# Patient Record
Sex: FEMALE | Race: OTHER | HISPANIC OR LATINO
[De-identification: names, ages, dates, MRNs, and addresses within clinical notes are randomized per-mention and may not be internally consistent; named-entity substitution may affect disease eponyms.]

---

## 2017-02-03 ENCOUNTER — APPOINTMENT (OUTPATIENT)
Dept: FAMILY MEDICINE | Facility: CLINIC | Age: 47
End: 2017-02-03

## 2017-02-03 ENCOUNTER — OTHER (OUTPATIENT)
Age: 47
End: 2017-02-03

## 2017-02-03 VITALS — HEART RATE: 72 BPM | DIASTOLIC BLOOD PRESSURE: 60 MMHG | SYSTOLIC BLOOD PRESSURE: 92 MMHG

## 2017-02-03 DIAGNOSIS — J30.9 ALLERGIC RHINITIS, UNSPECIFIED: ICD-10-CM

## 2017-02-03 DIAGNOSIS — J32.9 CHRONIC SINUSITIS, UNSPECIFIED: ICD-10-CM

## 2017-04-18 ENCOUNTER — MEDICATION RENEWAL (OUTPATIENT)
Age: 47
End: 2017-04-18

## 2017-06-26 ENCOUNTER — NON-APPOINTMENT (OUTPATIENT)
Age: 47
End: 2017-06-26

## 2017-06-26 ENCOUNTER — APPOINTMENT (OUTPATIENT)
Dept: FAMILY MEDICINE | Facility: CLINIC | Age: 47
End: 2017-06-26

## 2017-06-26 VITALS
BODY MASS INDEX: 23.56 KG/M2 | TEMPERATURE: 99.2 F | SYSTOLIC BLOOD PRESSURE: 98 MMHG | HEART RATE: 92 BPM | DIASTOLIC BLOOD PRESSURE: 62 MMHG | WEIGHT: 138 LBS | OXYGEN SATURATION: 98 % | HEIGHT: 64 IN

## 2017-06-26 DIAGNOSIS — R42 DIZZINESS AND GIDDINESS: ICD-10-CM

## 2017-06-26 RX ORDER — AMOXICILLIN AND CLAVULANATE POTASSIUM 875; 125 MG/1; MG/1
875-125 TABLET, COATED ORAL
Qty: 20 | Refills: 0 | Status: DISCONTINUED | COMMUNITY
Start: 2017-02-03 | End: 2017-06-26

## 2017-06-26 RX ORDER — LEVOCETIRIZINE DIHYDROCHLORIDE 5 MG/1
5 TABLET ORAL DAILY
Qty: 30 | Refills: 5 | Status: DISCONTINUED | COMMUNITY
Start: 2017-02-03 | End: 2017-06-26

## 2017-06-27 ENCOUNTER — INPATIENT (INPATIENT)
Facility: HOSPITAL | Age: 47
LOS: 1 days | Discharge: HOME | DRG: 282 | End: 2017-06-29
Attending: HOSPITALIST | Admitting: THORACIC SURGERY (CARDIOTHORACIC VASCULAR SURGERY)
Payer: COMMERCIAL

## 2017-06-27 VITALS
DIASTOLIC BLOOD PRESSURE: 61 MMHG | RESPIRATION RATE: 18 BRPM | OXYGEN SATURATION: 100 % | HEART RATE: 77 BPM | TEMPERATURE: 98 F | SYSTOLIC BLOOD PRESSURE: 128 MMHG

## 2017-06-27 DIAGNOSIS — I51.81 TAKOTSUBO SYNDROME: ICD-10-CM

## 2017-06-27 DIAGNOSIS — F41.9 ANXIETY DISORDER, UNSPECIFIED: ICD-10-CM

## 2017-06-27 DIAGNOSIS — I25.10 ATHEROSCLEROTIC HEART DISEASE OF NATIVE CORONARY ARTERY WITHOUT ANGINA PECTORIS: ICD-10-CM

## 2017-06-27 DIAGNOSIS — E78.5 HYPERLIPIDEMIA, UNSPECIFIED: ICD-10-CM

## 2017-06-27 DIAGNOSIS — Z98.890 OTHER SPECIFIED POSTPROCEDURAL STATES: Chronic | ICD-10-CM

## 2017-06-27 DIAGNOSIS — N94.6 DYSMENORRHEA, UNSPECIFIED: ICD-10-CM

## 2017-06-27 DIAGNOSIS — M54.42 LUMBAGO WITH SCIATICA, LEFT SIDE: ICD-10-CM

## 2017-06-27 DIAGNOSIS — I21.4 NON-ST ELEVATION (NSTEMI) MYOCARDIAL INFARCTION: ICD-10-CM

## 2017-06-27 PROBLEM — R42 LIGHTHEADEDNESS: Status: ACTIVE | Noted: 2017-06-27

## 2017-06-27 LAB
ANION GAP SERPL CALC-SCNC: 15 MMOL/L — SIGNIFICANT CHANGE UP (ref 5–17)
APTT BLD: 28.7 SEC — SIGNIFICANT CHANGE UP (ref 27.5–37.4)
BUN SERPL-MCNC: 6 MG/DL — LOW (ref 8–20)
CALCIUM SERPL-MCNC: 8.9 MG/DL — SIGNIFICANT CHANGE UP (ref 8.6–10.2)
CHLORIDE SERPL-SCNC: 101 MMOL/L — SIGNIFICANT CHANGE UP (ref 98–107)
CHOLEST SERPL-MCNC: 210 MG/DL — HIGH (ref 110–199)
CO2 SERPL-SCNC: 24 MMOL/L — SIGNIFICANT CHANGE UP (ref 22–29)
CREAT SERPL-MCNC: 0.47 MG/DL — LOW (ref 0.5–1.3)
GLUCOSE SERPL-MCNC: 131 MG/DL — HIGH (ref 70–115)
HDLC SERPL-MCNC: 44 MG/DL — LOW
INR BLD: 1.02 RATIO — SIGNIFICANT CHANGE UP (ref 0.88–1.16)
LIPID PNL WITH DIRECT LDL SERPL: 104 MG/DL — SIGNIFICANT CHANGE UP
POTASSIUM SERPL-MCNC: 3.8 MMOL/L — SIGNIFICANT CHANGE UP (ref 3.5–5.3)
POTASSIUM SERPL-SCNC: 3.8 MMOL/L — SIGNIFICANT CHANGE UP (ref 3.5–5.3)
PROTHROM AB SERPL-ACNC: 11.2 SEC — SIGNIFICANT CHANGE UP (ref 9.8–12.7)
SODIUM SERPL-SCNC: 140 MMOL/L — SIGNIFICANT CHANGE UP (ref 135–145)
TOTAL CHOLESTEROL/HDL RATIO MEASUREMENT: 5 RATIO — SIGNIFICANT CHANGE UP (ref 3.3–7.1)
TRIGL SERPL-MCNC: 311 MG/DL — HIGH (ref 10–200)
TROPONIN T SERPL-MCNC: 0.04 NG/ML — SIGNIFICANT CHANGE UP (ref 0–0.06)

## 2017-06-27 PROCEDURE — 99255 IP/OBS CONSLTJ NEW/EST HI 80: CPT

## 2017-06-27 PROCEDURE — 93458 L HRT ARTERY/VENTRICLE ANGIO: CPT | Mod: 26

## 2017-06-27 PROCEDURE — 93306 TTE W/DOPPLER COMPLETE: CPT | Mod: 26

## 2017-06-27 PROCEDURE — 93010 ELECTROCARDIOGRAM REPORT: CPT

## 2017-06-27 PROCEDURE — 99232 SBSQ HOSP IP/OBS MODERATE 35: CPT

## 2017-06-27 RX ORDER — CARVEDILOL PHOSPHATE 80 MG/1
3.12 CAPSULE, EXTENDED RELEASE ORAL EVERY 12 HOURS
Qty: 0 | Refills: 0 | Status: DISCONTINUED | OUTPATIENT
Start: 2017-06-27 | End: 2017-06-29

## 2017-06-27 RX ORDER — PANTOPRAZOLE SODIUM 20 MG/1
40 TABLET, DELAYED RELEASE ORAL
Qty: 0 | Refills: 0 | Status: DISCONTINUED | OUTPATIENT
Start: 2017-06-27 | End: 2017-06-29

## 2017-06-27 RX ORDER — ACETAMINOPHEN 500 MG
650 TABLET ORAL EVERY 6 HOURS
Qty: 0 | Refills: 0 | Status: DISCONTINUED | OUTPATIENT
Start: 2017-06-27 | End: 2017-06-29

## 2017-06-27 RX ORDER — ASPIRIN/CALCIUM CARB/MAGNESIUM 324 MG
81 TABLET ORAL DAILY
Qty: 0 | Refills: 0 | Status: DISCONTINUED | OUTPATIENT
Start: 2017-06-27 | End: 2017-06-29

## 2017-06-27 RX ORDER — ENOXAPARIN SODIUM 100 MG/ML
40 INJECTION SUBCUTANEOUS DAILY
Qty: 0 | Refills: 0 | Status: DISCONTINUED | OUTPATIENT
Start: 2017-06-28 | End: 2017-06-29

## 2017-06-27 RX ORDER — POTASSIUM CHLORIDE 20 MEQ
20 PACKET (EA) ORAL ONCE
Qty: 0 | Refills: 0 | Status: COMPLETED | OUTPATIENT
Start: 2017-06-27 | End: 2017-06-27

## 2017-06-27 RX ADMIN — Medication 20 MILLIEQUIVALENT(S): at 20:42

## 2017-06-27 RX ADMIN — Medication 650 MILLIGRAM(S): at 22:24

## 2017-06-27 RX ADMIN — CARVEDILOL PHOSPHATE 3.12 MILLIGRAM(S): 80 CAPSULE, EXTENDED RELEASE ORAL at 17:26

## 2017-06-27 RX ADMIN — Medication 650 MILLIGRAM(S): at 20:42

## 2017-06-27 NOTE — DISCHARGE NOTE ADULT - PLAN OF CARE
Remain free of worsening cardiomyopathy Restricted use with no heavy lifting of affected arm for 48 hours.  No submerging the arm in water for 48 hours.  You may start showering today.  Call your doctor for any bleeding, swelling, loss of sensation in the hand or fingers, or fingers turning blue.  If heavy bleeding or large lumps form, hold pressure at the spot and come to the Emergency Room.  Follow up with Dr. Montanez in one to two weeks.

## 2017-06-27 NOTE — DISCHARGE NOTE ADULT - CARE PLAN
Principal Discharge DX:	Stress-induced cardiomyopathy  Goal:	Remain free of worsening cardiomyopathy  Instructions for follow-up, activity and diet:	Restricted use with no heavy lifting of affected arm for 48 hours.  No submerging the arm in water for 48 hours.  You may start showering today.  Call your doctor for any bleeding, swelling, loss of sensation in the hand or fingers, or fingers turning blue.  If heavy bleeding or large lumps form, hold pressure at the spot and come to the Emergency Room.  Follow up with Dr. Montanez in one to two weeks.

## 2017-06-27 NOTE — DISCHARGE NOTE ADULT - HOSPITAL COURSE
46 y/o female with no significant past medical history metromenorrhagia ( takes birth control pills for this reason) who went to [place] ER 6/26/17 from PMD office with c/o CP and SOB x 2 days intermittently.  Symptoms associated with dizziness, lightheadedness, and diaphoresis.  In the ER, the patient was noted to have elevated troponin 2.59 and hypotension BP 80/60.  CTA was negative for PE at [facility].  Urine pregnancy negative.  Patient transferred to [facility] for [facility] to r/o CAD on 6/27 under CTICU service.   Patient underwent  cardiac catheterization on 6/27. Revealed EF 40%, normal coronaries and morphology consistent with takotsubo cardiomyopathy.  transferred out of CTICU 6/28. She had ventricular ectopics. Started on toprol -XL per cardio recommendations prior tp dc. Will need f/u with cardio and PMD for hyperlipidemia.Discussed with pt in details      Time spent in discharge planning and co-ordination : 40min

## 2017-06-27 NOTE — DISCHARGE NOTE ADULT - PATIENT PORTAL LINK FT
“You can access the [facility] Patient Portal, offered by [facility], by registering with the following website: [email or web address]”

## 2017-06-27 NOTE — H&P PST ADULT - NSANTHOSAYNRD_GEN_A_CORE
No. HOMER screening performed.  STOP BANG Legend: 0-2 = LOW Risk; 3-4 = INTERMEDIATE Risk; 5-8 = HIGH Risk

## 2017-06-27 NOTE — PROGRESS NOTE ADULT - PROBLEM SELECTOR PLAN 1
S/P Left Heart Cath showing no coronary disease  Currently NSR with stable blood pressure  Continuous cardiac monitoring   Per Dr. Giraldo, plan to monitor patient tonight and downgrade to telemetry in AM  F/U cardiac enzymes with morning labs- currently downtrending

## 2017-06-27 NOTE — PATIENT PROFILE ADULT. - FAMILY HISTORY
Grandparent  Still living? Unknown  CAD (coronary artery disease), Age at diagnosis: 61-70     Uncle  Still living? Yes, Estimated age: Age Unknown  CAD (coronary artery disease), Age at diagnosis: 51-60

## 2017-06-27 NOTE — H&P PST ADULT - HISTORY OF PRESENT ILLNESS
This is a 46 y/o female with h/o chronic back pain and dysmenorrhea ( takes birth control pills for this reason) who went to [place] ER 6/26/17 from PMD office with c/o CP and SOB x 2 days intermittently.  Symptoms associated with dizziness, lightheadedness, and diaphoresis.  In the ER, the patient was noted to have elevated troponin 2.59 and hypotension BP 80/60.  CTA was negative for PE at [facility].  Urine pregnancy negative.  Patient transferred to [facility] for LHC to r/o CAD.

## 2017-06-27 NOTE — DISCHARGE NOTE ADULT - MEDICATION SUMMARY - MEDICATIONS TO TAKE
I will START or STAY ON the medications listed below when I get home from the hospital:    aspirin 81 mg oral tablet, chewable  -- 1 tab(s) by mouth once a day  -- Indication: For Prophylactic measure    atorvastatin 20 mg oral tablet  -- 1 tab(s) by mouth once a day  -- Avoid grapefruit and grapefruit juice while taking this medication.  Do not take this drug if you are pregnant.  It is very important that you take or use this exactly as directed.  Do not skip doses or discontinue unless directed by your doctor.  Obtain medical advice before taking any non-prescription drugs as some may affect the action of this medication.  Take with food or milk.    -- Indication: For Mixed hyperlipidemia    metoprolol 25 mg oral tablet, extended release  -- 1 tab(s) by mouth once a day  -- It is very important that you take or use this exactly as directed.  Do not skip doses or discontinue unless directed by your doctor.  May cause drowsiness.  Alcohol may intensify this effect.  Use care when operating dangerous machinery.  Some non-prescription drugs may aggravate your condition.  Read all labels carefully.  If a warning appears, check with your doctor before taking.  Swallow whole.  Do not crush.  Take with food or milk.  This drug may impair the ability to drive or operate machinery.  Use care until you become familiar with its effects.    -- Indication: For Stress-induced cardiomyopathy    Flexeril  -- 5 milligram(s) by mouth once a day (at bedtime)  -- Indication: For Chronic back pain

## 2017-06-27 NOTE — PROGRESS NOTE ADULT - ASSESSMENT
47 year old female admitted to Cardiothoracic ICU. Patient started having symptoms of nausea, lightheadedness, diaphoresis, and dyspnea on exertion since Saturday 6/24, symptoms worsened on Sunday when she felt lightheaded and near-faint when climbing stairs. Patient states her symptoms worsened with minimal exertion, such as walking from her home to her car. She then presented to her PCP Dr. Vasques Monday morning at which point she was sent to [facility]'s ER. On admission, patient had elevated troponins (2.09 - per chart); hypotension 80/60; CTA that was negative for PE, Urine HcG negative; and was then transferred to [facility] for LHC.  6/27: LHC: normal coronary arteries; EF 40%; apical hypokinesis, TTE done, results pending.

## 2017-06-27 NOTE — DISCHARGE NOTE ADULT - MEDICATION SUMMARY - MEDICATIONS TO STOP TAKING
I will STOP taking the medications listed below when I get home from the hospital:    Balziva 0.4 mg-35 mcg oral tablet  -- 1 tab(s) by mouth once a day

## 2017-06-27 NOTE — PROGRESS NOTE ADULT - SUBJECTIVE AND OBJECTIVE BOX
Cardiology NP note:     -see H & P    -s/p LHC: normal coronary arteries; EF 40%; apical hypokinesis consistent with stress induced cardiomyopathy or Takotsubo.  -Right radial band in place--Site benign without hematoma/bleeding; RUE warm/mobile/acyanotic; +right ulnar pulse  -VS: /60 Tele NSR 69 RR 12 Pox 100    -48 y/o female admitted to OSH with +NSTEMI now s/p LHC revealing normal coronary arteries and apical hypokinesis consistent with stress induced cardiomyopathy EF 40%.  -Admit to Tele  -Add low dose beta blocker and baby aspirin (BP low and does not allow for addition of ACEI at this time); discontinue plavix  -Discontinue radial band at 1530  -Bedrest until 1530 then OOB as tolerated  -Home meds: Birth control pills not recommended at this time as per Dr. Montanez  -D/W Dr. Montanez

## 2017-06-27 NOTE — PROGRESS NOTE ADULT - SUBJECTIVE AND OBJECTIVE BOX
Subjective:  47 year old female now admitted to CTICU, no acute complaints.     Past Medical History:  Atherosclerosis of native coronary artery without angina pectoris  CAD (coronary artery disease) (Grandparent, Uncle)  Diverticulitis  Arthritis  Anxiety  Chronic back pain  Dysmenorrhea  Stress-induced cardiomyopathy  History of back surgery  NSTEMI      aspirin  chewable 81 milliGRAM(s) Chew daily  carvedilol 3.125 milliGRAM(s) Oral every 12 hours  potassium chloride    Tablet ER 20 milliEquivalent(s) Oral once  MEDICATIONS  (PRN):    Height (cm): 162.6 (06-27 @ 14:22)  Weight (kg): 63.503 (06-27 @ 14:22)  BMI (kg/m2): 24 (06-27 @ 14:22)  BSA (m2): 1.68 (06-27 @ 14:22)  Daily     Daily         06-27    140  |  101  |  6.0<L>  ----------------------------<  131<H>  3.8   |  24.0  |  0.47<L>    Ca    8.9      27 Jun 2017 17:35      CARDIAC MARKERS ( 27 Jun 2017 17:35 )  x     / 0.04 ng/mL / x     / x     / x        PT/INR - ( 27 Jun 2017 17:35 )   PT: 11.2 sec;   INR: 1.02 ratio         PTT - ( 27 Jun 2017 17:35 )  PTT:28.7 sec      Objective:  T(C): 36.9 (06-27-17 @ 18:00), Max: 36.9 (06-27-17 @ 18:00)  HR: 79 (06-27-17 @ 19:05) (62 - 84)  BP: 115/54 (06-27-17 @ 19:05) (97/58 - 128/61)  RR: 23 (06-27-17 @ 19:05) (16 - 23)  SpO2: 99% (06-27-17 @ 19:05) (97% - 100%)  Wt(kg): --CAPILLARY BLOOD GLUCOSE      I&O's Summary      Physical Exam:  Neuro: awake, alert, oriented x 3  Pulm: CTA b/l, no wheezing or rales  CV: S1S2, no murmurs, NSR, RR  Abd: Soft, NT, ND, normoactive bowel sounds  Ext: +DP pulses b/l, no pedal edema, no asymmetry of b/l lower extremities  Inc: Right radial cath site without hematoma

## 2017-06-27 NOTE — DISCHARGE NOTE ADULT - CARE PROVIDER_API CALL
Nabil Montanez), Cardiology; Internal Medicine; Interventional Cardiology  [address] [place] [address]  Phone: [phone]  Fax: [phone]

## 2017-06-27 NOTE — DISCHARGE NOTE ADULT - NS AS ACTIVITY OBS
Showering allowed/Stairs allowed/No Heavy lifting/straining/Walking-Outdoors allowed/Walking-Indoors allowed

## 2017-06-28 DIAGNOSIS — Z29.9 ENCOUNTER FOR PROPHYLACTIC MEASURES, UNSPECIFIED: ICD-10-CM

## 2017-06-28 DIAGNOSIS — R52 PAIN, UNSPECIFIED: ICD-10-CM

## 2017-06-28 DIAGNOSIS — I51.81 TAKOTSUBO SYNDROME: ICD-10-CM

## 2017-06-28 DIAGNOSIS — I49.3 VENTRICULAR PREMATURE DEPOLARIZATION: ICD-10-CM

## 2017-06-28 DIAGNOSIS — N92.1 EXCESSIVE AND FREQUENT MENSTRUATION WITH IRREGULAR CYCLE: ICD-10-CM

## 2017-06-28 LAB
ANION GAP SERPL CALC-SCNC: 14 MMOL/L — SIGNIFICANT CHANGE UP (ref 5–17)
BUN SERPL-MCNC: 7 MG/DL — LOW (ref 8–20)
CALCIUM SERPL-MCNC: 8.9 MG/DL — SIGNIFICANT CHANGE UP (ref 8.6–10.2)
CHLORIDE SERPL-SCNC: 102 MMOL/L — SIGNIFICANT CHANGE UP (ref 98–107)
CK SERPL-CCNC: 54 U/L — SIGNIFICANT CHANGE UP (ref 25–170)
CO2 SERPL-SCNC: 23 MMOL/L — SIGNIFICANT CHANGE UP (ref 22–29)
CREAT SERPL-MCNC: 0.51 MG/DL — SIGNIFICANT CHANGE UP (ref 0.5–1.3)
GLUCOSE SERPL-MCNC: 91 MG/DL — SIGNIFICANT CHANGE UP (ref 70–115)
HCT VFR BLD CALC: 37.8 % — SIGNIFICANT CHANGE UP (ref 37–47)
HGB BLD-MCNC: 13.1 G/DL — SIGNIFICANT CHANGE UP (ref 12–16)
MAGNESIUM SERPL-MCNC: 1.9 MG/DL — SIGNIFICANT CHANGE UP (ref 1.6–2.6)
MCHC RBC-ENTMCNC: 29.3 PG — SIGNIFICANT CHANGE UP (ref 27–31)
MCHC RBC-ENTMCNC: 34.7 G/DL — SIGNIFICANT CHANGE UP (ref 32–36)
MCV RBC AUTO: 84.6 FL — SIGNIFICANT CHANGE UP (ref 81–99)
PLATELET # BLD AUTO: 311 K/UL — SIGNIFICANT CHANGE UP (ref 150–400)
POTASSIUM SERPL-MCNC: 4.2 MMOL/L — SIGNIFICANT CHANGE UP (ref 3.5–5.3)
POTASSIUM SERPL-SCNC: 4.2 MMOL/L — SIGNIFICANT CHANGE UP (ref 3.5–5.3)
RBC # BLD: 4.47 M/UL — SIGNIFICANT CHANGE UP (ref 4.4–5.2)
RBC # FLD: 12.3 % — SIGNIFICANT CHANGE UP (ref 11–15.6)
SODIUM SERPL-SCNC: 139 MMOL/L — SIGNIFICANT CHANGE UP (ref 135–145)
TROPONIN T SERPL-MCNC: 0.02 NG/ML — SIGNIFICANT CHANGE UP (ref 0–0.06)
WBC # BLD: 7.5 K/UL — SIGNIFICANT CHANGE UP (ref 4.8–10.8)
WBC # FLD AUTO: 7.5 K/UL — SIGNIFICANT CHANGE UP (ref 4.8–10.8)

## 2017-06-28 PROCEDURE — 99233 SBSQ HOSP IP/OBS HIGH 50: CPT

## 2017-06-28 PROCEDURE — 99254 IP/OBS CNSLTJ NEW/EST MOD 60: CPT

## 2017-06-28 PROCEDURE — 93010 ELECTROCARDIOGRAM REPORT: CPT

## 2017-06-28 PROCEDURE — 71010: CPT | Mod: 26

## 2017-06-28 RX ORDER — LOPERAMIDE HCL 2 MG
2 TABLET ORAL EVERY 8 HOURS
Qty: 0 | Refills: 0 | Status: DISCONTINUED | OUTPATIENT
Start: 2017-06-28 | End: 2017-06-29

## 2017-06-28 RX ORDER — CYCLOBENZAPRINE HYDROCHLORIDE 10 MG/1
5 TABLET, FILM COATED ORAL AT BEDTIME
Qty: 0 | Refills: 0 | Status: DISCONTINUED | OUTPATIENT
Start: 2017-06-28 | End: 2017-06-29

## 2017-06-28 RX ADMIN — CARVEDILOL PHOSPHATE 3.12 MILLIGRAM(S): 80 CAPSULE, EXTENDED RELEASE ORAL at 18:20

## 2017-06-28 RX ADMIN — PANTOPRAZOLE SODIUM 40 MILLIGRAM(S): 20 TABLET, DELAYED RELEASE ORAL at 06:39

## 2017-06-28 RX ADMIN — Medication 81 MILLIGRAM(S): at 12:39

## 2017-06-28 RX ADMIN — CYCLOBENZAPRINE HYDROCHLORIDE 5 MILLIGRAM(S): 10 TABLET, FILM COATED ORAL at 21:01

## 2017-06-28 RX ADMIN — CARVEDILOL PHOSPHATE 3.12 MILLIGRAM(S): 80 CAPSULE, EXTENDED RELEASE ORAL at 06:39

## 2017-06-28 RX ADMIN — Medication 650 MILLIGRAM(S): at 04:38

## 2017-06-28 RX ADMIN — Medication 650 MILLIGRAM(S): at 12:54

## 2017-06-28 RX ADMIN — Medication 2 MILLIGRAM(S): at 12:40

## 2017-06-28 RX ADMIN — Medication 650 MILLIGRAM(S): at 12:39

## 2017-06-28 RX ADMIN — Medication 650 MILLIGRAM(S): at 03:33

## 2017-06-28 NOTE — PROGRESS NOTE ADULT - PROBLEM SELECTOR PLAN 1
continue coreg  ACEI when BP will tolerate  TTE done (awaiting official read)  social work consult for support/resources upon discharge  will confirm with Dr Giraldo on am rounds re: transfer patient to medicine service for further care as pt does not require CT surgical intervention at this time continue coreg  ACEI when BP will tolerate  TTE done (awaiting official read)  social work consult for support/resources upon discharge  stable for transfer to floor  will confirm with Dr Giraldo on am rounds re: transfer patient to medicine service for further care as pt does not require CT surgical intervention at this time

## 2017-06-28 NOTE — PROGRESS NOTE ADULT - PROBLEM SELECTOR PLAN 1
continue coreg  ACEI when BP will tolerate  TTE done  social work consult for support/resources upon discharge  stable for transfer to medicine / telemetry floor  Discussed with,  Dr Giraldo on am rounds patient is not surgical candidate, is stable for floor and transfer to medicine service.

## 2017-06-28 NOTE — PROGRESS NOTE ADULT - SUBJECTIVE AND OBJECTIVE BOX
MEDICINE  TRANSFER NOTE       LYNN HORTA    [id number]    47y      Female    HPI in brief:  46 y/o female with no significant past medical history metromenorrhagia ( takes birth control pills for this reason) who went to [place] ER 6/26/17 from PMD office with c/o CP and SOB x 2 days intermittently.  Symptoms associated with dizziness, lightheadedness, and diaphoresis.  In the ER, the patient was noted to have elevated troponin 2.59 and hypotension BP 80/60.  CTA was negative for PE at [facility].  Urine pregnancy negative.  Patient transferred to [facility] for C to r/o CAD on 6/27 under CTICU service.   Patient underwent  cardiac catheterization on 6/27. Revealed EF 40%, normal coronaries and morphology consistent with takotsubo cardiomyopathy.  Patient is hemodynamically stable, stable for floor.    She denies chest pain, shortness of breath but states that she feels a little anxious which she states is her baseline.  patient states that she has been stressful since last 1mth.     CC:chest pain     INTERVAL HPI/OVERNIGHT EVENTS:  as in HPI    REVIEW OF SYSTEMS:    CONSTITUTIONAL: No fever, weight loss, or fatigue  RESPIRATORY: No cough, wheezing, hemoptysis; No shortness of breath  GASTROINTESTINAL: No abdominal or epigastric pain. No nausea, vomiting        Vital Signs Last 24 Hrs  T(C): 36.9 (28 Jun 2017 12:00), Max: 36.9 (27 Jun 2017 18:00)  T(F): 98.4 (28 Jun 2017 12:00), Max: 98.4 (27 Jun 2017 18:00)  HR: 97 (28 Jun 2017 08:00) (62 - 97)  BP: 105/58 (28 Jun 2017 08:00) (82/48 - 118/58)  BP(mean): 77 (28 Jun 2017 08:00) (60 - 88)  RR: 20 (28 Jun 2017 08:00) (16 - 23)  SpO2: 99% (28 Jun 2017 08:00) (97% - 100%)    PHYSICAL EXAM:    GENERAL: NAD, well-groomed  HEENT: PERRL, +EOMI  NECK: soft, Supple, No JVD,   CHEST/LUNG: Clear to percussion bilaterally; No wheezing  HEART: S1S2+, Regular rate and rhythm; No murmurs, rubs, or gallops  ABDOMEN: Soft, Nontender, Nondistended; Bowel sounds present  EXTREMITIES:  No clubbing, cyanosis, or edema  NEURO: AAOX3, no focal deficits  PSYCH: normal mood      06-27 @ 07:01  -  06-28 @ 07:00  --------------------------------------------------------  IN: 460 mL / OUT: 0 mL / NET: 460 mL    06-28 @ 07:01 - 06-28 @ 15:06  --------------------------------------------------------  IN: 740 mL / OUT: 400 mL / NET: 340 mL        LABS:                        13.1   7.5   )-----------( 311      ( 28 Jun 2017 04:09 )             37.8     06-28    139  |  102  |  7.0<L>  ----------------------------<  91  4.2   |  23.0  |  0.51    Ca    8.9      28 Jun 2017 04:09  Mg     1.9     06-28      PT/INR - ( 27 Jun 2017 17:35 )   PT: 11.2 sec;   INR: 1.02 ratio         PTT - ( 27 Jun 2017 17:35 )  PTT:28.7 sec        MEDICATIONS  (STANDING):  aspirin  chewable 81 milliGRAM(s) Chew daily  carvedilol 3.125 milliGRAM(s) Oral every 12 hours  enoxaparin Injectable 40 milliGRAM(s) SubCutaneous daily  pantoprazole    Tablet 40 milliGRAM(s) Oral before breakfast    MEDICATIONS  (PRN):  acetaminophen   Tablet. 650 milliGRAM(s) Oral every 6 hours PRN pain  loperamide 2 milliGRAM(s) Oral every 8 hours PRN Diarrhea      RADIOLOGY & ADDITIONAL TESTS:    CXR - NAD    ECHO - Summary:   1. Endocardial visualization was enhanced with intravenous echo contrast.   2. Technically limited study.   3. Normal global left ventricular systolic function.   4. Left ventricular ejection fraction, by visual estimation, is 60 to   65%.   5. There is mild septal left ventricular hypertrophy.   6. False tendon is seen in LV apex.   7. Trace mitral valve regurgitation.   8. There is no evidence of pericardial effusion.    EKG - tracing reviewed - NSR

## 2017-06-28 NOTE — PROGRESS NOTE ADULT - SUBJECTIVE AND OBJECTIVE BOX
Subjective: right wrist pain at cath site but otherwise denies c/o at this time.  Denies CP, SOB, palpitations, dizziness, N/V, other c/o.    T(C): 36.9 (06-27-17 @ 18:00), Max: 36.9 (06-27-17 @ 18:00)  HR: 68 (06-28-17 @ 04:00) (62 - 87), NSR  BP: 101/59 (06-28-17 @ 04:00) (82/48 - 128/61)  RR: 19 (06-28-17 @ 04:00) (16 - 23)  SpO2: 98% (06-28-17 @ 04:00) (97% - 100%) on room air      I&O's Detail    27 Jun 2017 07:01  -  28 Jun 2017 04:48  --------------------------------------------------------  IN:    Oral Fluid: 460 mL  Total IN: 460 mL    OUT:  Total OUT: 0 mL    Total NET: 460 mL      06-27    140  |  101  |  6.0<L>  ----------------------------<  131<H>  3.8   |  24.0  |  0.47<L>    Ca    8.9      27 Jun 2017 17:35                        13.1   7.5   )-----------( 311      ( 28 Jun 2017 04:09 )             37.8     PT/INR - ( 27 Jun 2017 17:35 )   PT: 11.2 sec;   INR: 1.02 ratio    PTT - ( 27 Jun 2017 17:35 )  PTT:28.7 sec    CARDIAC MARKERS ( 27 Jun 2017 17:35 )  CKx     / CKMBx     / Troponin T0.04 ng/mL /    < from: Cardiac Cath Lab - Adult (06.27.17 @ 13:36) >  VENTRICLES: Analysis of regional contractile function demonstrated moderate  anterolateral hypokinesis, severe apical hypokinesis, and mild  diaphragmatic hypokinesis. EF estimated was 40 %.  CORONARY VESSELS: The coronary circulation is right dominant.  LM:   --  LM: Normal.  LAD:   --  LAD: Normal.  CX:   --  Circumflex: Normal.  RCA:   --  RCA: Normal.  COMPLICATIONS: No complications occurred during the cath lab visit.  DIAGNOSTIC IMPRESSIONS: Normal coronary arteries, apical hypokinesis.   Typical presentation for Takotsubo or stress induced cardiomyopathy.  DIAGNOSTIC RECOMMENDATIONS: ASA and beta blocker for ACS. BP does not allow for ACEI at this time.  < end of copied text >    6/28/17 CXR: clear/NAD      MEDICATIONS  (STANDING):  aspirin  chewable 81 milliGRAM(s) Chew daily  carvedilol 3.125 milliGRAM(s) Oral every 12 hours  enoxaparin Injectable 40 milliGRAM(s) SubCutaneous daily  pantoprazole    Tablet 40 milliGRAM(s) Oral before breakfast    MEDICATIONS  (PRN):  acetaminophen   Tablet. 650 milliGRAM(s) Oral every 6 hours PRN pain      Physical Exam  Neuro: A+O x 3, non-focal, speech clear and intact  Pulm: CTA, equal bilaterally  CV: RRR, +S1S2, no murmur  Abd: soft, NT, ND, +BS  Ext: VAZQUEZ x 4, no edema  right wrist tender at cath site but C/D/I with dsg

## 2017-06-28 NOTE — PROGRESS NOTE ADULT - PROBLEM SELECTOR PLAN 5
recommended by Dr Montanez that patient avoids further BCP/oral contraceptive use  consider Gyn consult (will defer need for to medicine/cardiology)
States anxiety is a chronic issue not treated with any medication    Plan discussed with Dr. Giraldo. Will further discuss plan at AM rounds.
recommended by Dr Montanez that patient avoids further BCP/oral contraceptive use  consider Gyn consult (will defer need for to medicine/cardiology)

## 2017-06-28 NOTE — PROGRESS NOTE ADULT - SUBJECTIVE AND OBJECTIVE BOX
CTICU Transfer Note:     Primary Care:   HPI    47y Female s/p cardiac catheterization via right radial artery. Revealed EF 40%, normal coronaries and morphology consistent with takotsubo cardiomyopathy     Subjective: Patient has no complaints of chest pain, says that she is mildly anxious.  states     T(C): 36.9 (06-27-17 @ 18:00), Max: 36.9 (06-27-17 @ 18:00)  HR: 77 (06-28-17 @ 07:00) (62 - 87)  BP: 91/53 (06-28-17 @ 07:00) (82/48 - 128/61)  ABP: --  ABP(mean): --  RR: 17 (06-28-17 @ 07:00) (16 - 23)  SpO2: 98% (06-28-17 @ 07:00) (97% - 100%)  Wt(kg): --  CVP(mm Hg): --  CO: --  CI: --  PA: --         06-28    139  |  102  |  7.0<L>  ----------------------------<  91  4.2   |  23.0  |  0.51    Ca    8.9      28 Jun 2017 04:09  Mg     1.9     06-28                                 13.1   7.5   )-----------( 311      ( 28 Jun 2017 04:09 )             37.8        PT/INR - ( 27 Jun 2017 17:35 )   PT: 11.2 sec;   INR: 1.02 ratio         PTT - ( 27 Jun 2017 17:35 )  PTT:28.7 sec       CARDIAC MARKERS ( 28 Jun 2017 04:09 )  CK54 U/L / CKMBx     / Troponin T0.02 ng/mL /  CARDIAC MARKERS ( 27 Jun 2017 17:35 )  CKx     / CKMBx     / Troponin T0.04 ng/mL /        CAPILLARY BLOOD GLUCOSE               CXR:    I&O's Detail    27 Jun 2017 07:01  -  28 Jun 2017 07:00  --------------------------------------------------------  IN:    Oral Fluid: 460 mL  Total IN: 460 mL    OUT:  Total OUT: 0 mL    Total NET: 460 mL          MEDICATIONS  (STANDING):  aspirin  chewable 81 milliGRAM(s) Chew daily  carvedilol 3.125 milliGRAM(s) Oral every 12 hours  enoxaparin Injectable 40 milliGRAM(s) SubCutaneous daily  pantoprazole    Tablet 40 milliGRAM(s) Oral before breakfast    MEDICATIONS  (PRN):  acetaminophen   Tablet. 650 milliGRAM(s) Oral every 6 hours PRN pain      Physical Exam  Neuro: A+O x 3, non-focal, speech clear and intact  Pulm: CTA, equal bilaterally  CV: RRR, +S1S2  Abd: soft, NT, ND, +BS  Ext: VAZQUEZ x 4, no edema  Inc: MSI C/D/I/stable w/ dsg, LLE C/D/I w/ dsg/Ace wrap    -----------------------------------------------------------------------------------------------------------------------------------------------------------------------------  Does the patient have a history of heart failure?        If yes, describe:        Patient’s preoperative EF:   Does the patient currently have heart failure:         If yes, describe:  Extubation within 24 hours of CTICU admission: Yes       Contraindications:  Beta-blockers within 24 hours of CTICU admission:        Contraindications:   Was Beta-blocker given Preop within 24hrs of OR? Yes        If No, reason:    Does the patient have a history of kidney disease: No       If yes, describe (CKD stage):       Patient’s baseline Cr:   Does the patient currently have renal failure? No       If yes, describe (ARF, Acute on Chronic Renal Failure, ATN):   Did the patient receive blood and/or products transfusion: Yes No         acute blood loss anemia Yes No       coagulopathy Yes No  Did the patient have SIRS postoperatively? No  DVT Prophylaxis within 24 hours: Yes       Type/Side: SCDs  Leanna-op antibiotics discontinued within 48 hours? Yes  Acute MI during admission or prior to transfer (within 8 weeks)? No  ----------------------------------------------------------------------------------------------------------------------------------------------------------------------------- CTICU Transfer Note:     Primary Care: Molly Amaral.  OB GYN: Libra Quintana      HPI:  Patient  is a 48 y/o female with h/o chronic back pain and dysmenorrhea ( takes birth control pills for this reason) who went to [place] ER 6/26/17 from PMD office with c/o CP and SOB x 2 days intermittently.  Symptoms associated with dizziness, lightheadedness, and diaphoresis.  In the ER, the patient was noted to have elevated troponin 2.59 and hypotension BP 80/60.  CTA was negative for PE at [facility].  Urine pregnancy negative.  Patient transferred to [facility] for C to r/o CAD.       Patient is now s/p cardiac catheterization via right radial artery. Revealed EF 40%, normal coronaries and morphology consistent with takotsubo cardiomyopathy.  Patient is hemodynamically stable off of pressors, off supplemental oxygen, and without arrhythmia.  Patient denies chest pain, shortness of breath but states that she feels a little anxious which she states is her baseline.  When asked about the inciting event for her takotsubo, patient states that she is under no specific emotional distress, but states that she has a stressful life.       Subjective: Patient has no complaints of chest pain, says that she is mildly anxious.  states     T(C): 36.9 (06-27-17 @ 18:00), Max: 36.9 (06-27-17 @ 18:00)  HR: 77 (06-28-17 @ 07:00) (62 - 87)  BP: 91/53 (06-28-17 @ 07:00) (82/48 - 128/61)  RR: 17 (06-28-17 @ 07:00) (16 - 23)  SpO2: 98% (06-28-17 @ 07:00) (97% - 100%)        06-28    139  |  102  |  7.0<L>  ----------------------------<  91  4.2   |  23.0  |  0.51    Ca    8.9      28 Jun 2017 04:09  Mg     1.9     06-28                                 13.1   7.5   )-----------( 311      ( 28 Jun 2017 04:09 )             37.8        PT/INR - ( 27 Jun 2017 17:35 )   PT: 11.2 sec;   INR: 1.02 ratio         PTT - ( 27 Jun 2017 17:35 )  PTT:28.7 sec       CARDIAC MARKERS ( 28 Jun 2017 04:09 )  CK54 U/L / CKMBx     / Troponin T0.02 ng/mL /  CARDIAC MARKERS ( 27 Jun 2017 17:35 )  CKx     / CKMBx     / Troponin T0.04 ng/mL /             Cath:   Cardiac Cath Lab - Adult (06.27.17 @ 13:36) >  VENTRICLES: Analysis of regional contractile function demonstrated moderate  anterolateral hypokinesis, severe apical hypokinesis, and mild  diaphragmatic hypokinesis. EF estimated was 40 %.  CORONARY VESSELS: The coronary circulation is right dominant.  LM:   --  LM: Normal.  LAD:   --  LAD: Normal.  CX:   --  Circumflex: Normal.  RCA:   --  RCA: Normal.  COMPLICATIONS: No complications occurred during the cath lab visit.  DIAGNOSTIC IMPRESSIONS: Normal coronary arteries, apical hypokinesis.  Typical presentation for Takotsubo or stress induced cardiomyopathy.  DIAGNOSTIC RECOMMENDATIONS: ASA and beta blocker for ACS. BP does not allow  for ACEI at this time.          I&O's Detail    27 Jun 2017 07:01  -  28 Jun 2017 07:00  --------------------------------------------------------  IN:    Oral Fluid: 460 mL  Total IN: 460 mL    OUT:  Total OUT: 0 mL    Total NET: 460 mL          MEDICATIONS  (STANDING):  aspirin  chewable 81 milliGRAM(s) Chew daily  carvedilol 3.125 milliGRAM(s) Oral every 12 hours  enoxaparin Injectable 40 milliGRAM(s) SubCutaneous daily  pantoprazole    Tablet 40 milliGRAM(s) Oral before breakfast    MEDICATIONS  (PRN):  acetaminophen   Tablet. 650 milliGRAM(s) Oral every 6 hours PRN pain      Physical Exam  Neuro: A+O x 3, non-focal, speech clear and intact  Pulm: CTA, equal bilaterally  CV: RRR, +S1S2, no murmurs appreciated  Abd: soft, NT, ND  Ext: VAZQUEZ x 4, no edema  Inc: Right radial cath sight mildly tender, some ecchymosis but no hematoma
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
## 2017-06-28 NOTE — PROGRESS NOTE ADULT - PROBLEM SELECTOR PLAN 6
lovenox for DVT prophylaxis  protonix for GI prophylaxis
lovenox for DVT prophylaxis  protonix for GI prophylaxis
Incidental finding on lab work up  Will repeat lab in AM and address need for statin

## 2017-06-28 NOTE — PROGRESS NOTE ADULT - ASSESSMENT
47 year old female with PMH dysmenorrhea, chronic back pain, back sx, anxiety, arthritis, diverticulitis. Pt to PMD on 6/26 with c/o CP, nausea, diaphoresis, near syncope since saturday/sunday. Sent to [facility] secondary to EKG changes where pt ruled in for NSTEMI (negative CTA for PE). Admitted 6/27 after transfer from [facility] for LHC which revealed normal coronaries but severe apical and moderate anterolateral hypokinesis with an EF of 40% consistent with Takotsubo or stress induced cardiomyopathy.

## 2017-06-28 NOTE — PROGRESS NOTE ADULT - ASSESSMENT
48 y/o female with no significant past medical history metromenorrhagia ( takes birth control pills for this reason) who went to [place] ER 6/26/17 from PMD office with c/o CP and SOB x 2 days intermittently.  Symptoms associated with dizziness, lightheadedness, and diaphoresis.  In the ER, the patient was noted to have elevated troponin 2.59 and hypotension BP 80/60.  CTA was negative for PE at [facility].  Urine pregnancy negative.  Patient transferred to [facility] for [facility] to r/o CAD on 6/27 under CTICU service.   Patient underwent  cardiac catheterization on 6/27. Revealed EF 40%, normal coronaries and morphology consistent with takotsubo cardiomyopathy.  transferred out of CTICU 6/28

## 2017-06-28 NOTE — PROGRESS NOTE ADULT - ASSESSMENT
47 year old female with PMH dysmenorrhea, chronic back pain, back sx, anxiety, arthritis, diverticulitis. Pt to PMD on 6/26 with c/o CP, nausea, diaphoresis, near syncope since saturday/sunday. Sent to [facility] secondary to EKG changes where pt ruled in for NSTEMI (negative CTA for PE). Admitted 6/27 after transfer from [facility] for LHC which revealed normal coronaries but severe apical and moderate anterolateral hypokinesis with an EF of 40% consistent with Takotsubo or stress induced cardiomyopathy.    Case was discussed with accepting physician Dr. Villalba who accepted patient to her service. All questions answered regarding patient's case and clinical condition

## 2017-06-28 NOTE — CONSULT NOTE ADULT - ASSESSMENT
Assessment: 47y Female with diagnosis of stress induced cardiomyopathy    Problem List:  1. Stress induced cardiomyopathy  2.     Plan:   1. Admit to:     2. Medications:       DAPT:        Statin:        Beta Blocker:        ACEI/ARB:     3. Diagnostics:        Labs:        Radiology:        Cardiology: Assessment: 47y Female with diagnosis of stress induced cardiomyopathy    Problem List:  1. Stress induced cardiomyopathy  2. NSVT    Plan:   1. Admit to:     2. Medications:       DAPT:        Statin:        Beta Blocker:        ACEI/ARB:     3. Diagnostics:        Labs:        Radiology:        Cardiology: Assessment: 47y Female with acute onset chest pain, normal coronaries on cardiac catheterization.  LV gram 40%, suggestive of Takotsubo.     1. Stress induced cardiomyopathy - LVEF now back to normal.  Chest pain free.  Unclear trigger.  Euvolemic.  No need for CHF meds.   2. ventricular ectopy - isolated PVCs and 1 episode of 5 beat NSVT, asymptomatic.  Structurally normal heart.  LV apex with false tendon (normal variant).  Would consider non-invasive cardiac monitoring as outpatient.  No beta blocker for now, monitor as outpatient.  3. elevated triglycerides on lipid panel - ? fasting; screen for diabetes mellitus - can be done as an outpatient.      Stable for discharge from cardiology standpoint.   She will follow up with Dr. Montanez as an outpatient.  D/W Dr. Montanez and Dr. Villalba.

## 2017-06-29 VITALS
SYSTOLIC BLOOD PRESSURE: 114 MMHG | TEMPERATURE: 98 F | DIASTOLIC BLOOD PRESSURE: 60 MMHG | RESPIRATION RATE: 16 BRPM | HEART RATE: 72 BPM | OXYGEN SATURATION: 97 %

## 2017-06-29 DIAGNOSIS — E78.2 MIXED HYPERLIPIDEMIA: ICD-10-CM

## 2017-06-29 PROCEDURE — 93005 ELECTROCARDIOGRAM TRACING: CPT

## 2017-06-29 PROCEDURE — 93010 ELECTROCARDIOGRAM REPORT: CPT

## 2017-06-29 PROCEDURE — 99239 HOSP IP/OBS DSCHRG MGMT >30: CPT

## 2017-06-29 PROCEDURE — 85027 COMPLETE CBC AUTOMATED: CPT

## 2017-06-29 PROCEDURE — 80048 BASIC METABOLIC PNL TOTAL CA: CPT

## 2017-06-29 PROCEDURE — 84484 ASSAY OF TROPONIN QUANT: CPT

## 2017-06-29 PROCEDURE — C1769: CPT

## 2017-06-29 PROCEDURE — C1894: CPT

## 2017-06-29 PROCEDURE — 85730 THROMBOPLASTIN TIME PARTIAL: CPT

## 2017-06-29 PROCEDURE — 85610 PROTHROMBIN TIME: CPT

## 2017-06-29 PROCEDURE — 36415 COLL VENOUS BLD VENIPUNCTURE: CPT

## 2017-06-29 PROCEDURE — 82550 ASSAY OF CK (CPK): CPT

## 2017-06-29 PROCEDURE — C1887: CPT

## 2017-06-29 PROCEDURE — 83735 ASSAY OF MAGNESIUM: CPT

## 2017-06-29 PROCEDURE — 93306 TTE W/DOPPLER COMPLETE: CPT

## 2017-06-29 PROCEDURE — 71045 X-RAY EXAM CHEST 1 VIEW: CPT

## 2017-06-29 PROCEDURE — 93458 L HRT ARTERY/VENTRICLE ANGIO: CPT

## 2017-06-29 PROCEDURE — 80061 LIPID PANEL: CPT

## 2017-06-29 RX ORDER — ATORVASTATIN CALCIUM 80 MG/1
1 TABLET, FILM COATED ORAL
Qty: 30 | Refills: 0 | OUTPATIENT
Start: 2017-06-29 | End: 2017-07-29

## 2017-06-29 RX ORDER — NORETHINDRONE AND ETHINYL ESTRADIOL 0.4-0.035
1 KIT ORAL
Qty: 0 | Refills: 0 | COMMUNITY

## 2017-06-29 RX ORDER — ASPIRIN/CALCIUM CARB/MAGNESIUM 324 MG
1 TABLET ORAL
Qty: 0 | Refills: 0 | DISCHARGE
Start: 2017-06-29

## 2017-06-29 RX ORDER — METOPROLOL TARTRATE 50 MG
1 TABLET ORAL
Qty: 30 | Refills: 0
Start: 2017-06-29 | End: 2017-07-29

## 2017-06-29 RX ORDER — ATORVASTATIN CALCIUM 80 MG/1
1 TABLET, FILM COATED ORAL
Qty: 30 | Refills: 0
Start: 2017-06-29 | End: 2017-07-29

## 2017-06-29 RX ADMIN — CARVEDILOL PHOSPHATE 3.12 MILLIGRAM(S): 80 CAPSULE, EXTENDED RELEASE ORAL at 05:57

## 2017-06-29 RX ADMIN — Medication 81 MILLIGRAM(S): at 09:56

## 2017-06-29 RX ADMIN — Medication 650 MILLIGRAM(S): at 09:56

## 2017-06-29 NOTE — PROGRESS NOTE ADULT - PROBLEM SELECTOR PLAN 3
pt refused any ant-anxiety medications
will defer starting statin to cardiology or medicine for total cholesterol greater than 200
Chronic issue from home  Will treat acute pain as needed
pt refused any ant-anxiety medications
will defer starting statin to cardiology or medicine for total cholesterol greater than 200

## 2017-06-29 NOTE — PROGRESS NOTE ADULT - PROBLEM SELECTOR PROBLEM 2
Menorrhagia with irregular cycle
NSTEMI (non-ST elevated myocardial infarction)
Menorrhagia with irregular cycle
NSTEMI (non-ST elevated myocardial infarction)
Stress-induced cardiomyopathy

## 2017-06-29 NOTE — PROGRESS NOTE ADULT - PROBLEM SELECTOR PLAN 2
continue aspirin, continue beta blocker, add ACE as BP allows.  Please follow up on official read of TTE.
hold hormone at present  will need gyn eval on outpt for other options/progesterone
Will follow with cardiology  Likely diagnosis as per Cardiology note  Will monitor enzymes, ECG in arrival in AM  TTE to evaluate LV function, F/U results   Treat acute pain issues
continue aspirin
hold hormone at present  will need gyn eval on outpt for other options/progesterone

## 2017-06-29 NOTE — PROGRESS NOTE ADULT - PROBLEM SELECTOR PROBLEM 1
Stress-induced cardiomyopathy
Takotsubo cardiomyopathy
NSTEMI (non-ST elevated myocardial infarction)
Stress-induced cardiomyopathy
Takotsubo cardiomyopathy

## 2017-06-29 NOTE — PROGRESS NOTE ADULT - PROBLEM SELECTOR PLAN 4
tylenol PRN.
On birth control at home  Will readdress restarting once discharge
tylenol PRN
statin on dc

## 2017-06-29 NOTE — PROGRESS NOTE ADULT - SUBJECTIVE AND OBJECTIVE BOX
LYNN HORTA    [id number]    47y      Female    CC: Chest pain, stress induced CMP    INTERVAL HPI/OVERNIGHT EVENTS: no acute events      Telemetry rteviewed - PVCs 4 occasional - asymptomatic    REVIEW OF SYSTEMS:      RESPIRATORY:; No shortness of breath  CARDIOVASCULAR: No chest pain, palpitations      Vital Signs Last 24 Hrs  T(C): 36.7 (29 Jun 2017 05:49), Max: 36.9 (28 Jun 2017 12:00)  T(F): 98.1 (29 Jun 2017 05:49), Max: 98.4 (28 Jun 2017 12:00)  HR: 73 (29 Jun 2017 05:49) (73 - 83)  BP: 112/62 (29 Jun 2017 05:49) (104/51 - 112/62)  BP(mean): 73 (28 Jun 2017 21:01) (73 - 74)  RR: 16 (29 Jun 2017 05:49) (16 - 26)  SpO2: 98% (29 Jun 2017 05:49) (98% - 99%)    PHYSICAL EXAM:    GENERAL: NAD, well-groomed  HEENT: PERRL, +EOMI  NECK: soft, Supple, No JVD,   CHEST/LUNG: Clear to percussion bilaterally; No wheezing  HEART: S1S2+, Regular rate and rhythm; No murmurs, rubs, or gallops  EXTREMITIES:  2+ Peripheral Pulses, No clubbing, cyanosis, or edema  NEURO: AAOX3, no focal deficits      06-28 @ 07:01 - 06-29 @ 07:00  --------------------------------------------------------  IN: 1740 mL / OUT: 1000 mL / NET: 740 mL    06-29 @ 07:01 - 06-29 @ 09:57  --------------------------------------------------------  IN: 260 mL / OUT: 200 mL / NET: 60 mL        LABS:                        13.1   7.5   )-----------( 311      ( 28 Jun 2017 04:09 )             37.8     06-28    139  |  102  |  7.0<L>  ----------------------------<  91  4.2   |  23.0  |  0.51    Ca    8.9      28 Jun 2017 04:09  Mg     1.9     06-28      PT/INR - ( 27 Jun 2017 17:35 )   PT: 11.2 sec;   INR: 1.02 ratio         PTT - ( 27 Jun 2017 17:35 )  PTT:28.7 sec        MEDICATIONS  (STANDING):  aspirin  chewable 81 milliGRAM(s) Chew daily  enoxaparin Injectable 40 milliGRAM(s) SubCutaneous daily  pantoprazole    Tablet 40 milliGRAM(s) Oral before breakfast  cyclobenzaprine 5 milliGRAM(s) Oral at bedtime    MEDICATIONS  (PRN):  acetaminophen   Tablet. 650 milliGRAM(s) Oral every 6 hours PRN pain  loperamide 2 milliGRAM(s) Oral every 8 hours PRN Diarrhea      RADIOLOGY & ADDITIONAL TESTS:

## 2017-06-29 NOTE — PROGRESS NOTE ADULT - ASSESSMENT
48 y/o female with no significant past medical history metromenorrhagia ( takes birth control pills for this reason) who went to [place] ER 6/26/17 from PMD office with c/o CP and SOB x 2 days intermittently.  Symptoms associated with dizziness, lightheadedness, and diaphoresis.  In the ER, the patient was noted to have elevated troponin 2.59 and hypotension BP 80/60.  CTA was negative for PE at [facility].  Urine pregnancy negative.  Patient transferred to [facility] for [facility] to r/o CAD on 6/27 under CTICU service.   Patient underwent  cardiac catheterization on 6/27. Revealed EF 40%, normal coronaries and morphology consistent with takotsubo cardiomyopathy.  transferred out of CTICU 6/28

## 2017-07-03 ENCOUNTER — APPOINTMENT (OUTPATIENT)
Dept: FAMILY MEDICINE | Facility: CLINIC | Age: 47
End: 2017-07-03

## 2017-07-03 VITALS
OXYGEN SATURATION: 99 % | HEART RATE: 100 BPM | BODY MASS INDEX: 24.07 KG/M2 | WEIGHT: 141 LBS | DIASTOLIC BLOOD PRESSURE: 70 MMHG | HEIGHT: 64 IN | SYSTOLIC BLOOD PRESSURE: 110 MMHG

## 2017-07-07 ENCOUNTER — NON-APPOINTMENT (OUTPATIENT)
Age: 47
End: 2017-07-07

## 2017-07-07 ENCOUNTER — APPOINTMENT (OUTPATIENT)
Dept: CARDIOLOGY | Facility: CLINIC | Age: 47
End: 2017-07-07

## 2017-07-07 VITALS
HEART RATE: 80 BPM | OXYGEN SATURATION: 99 % | BODY MASS INDEX: 24.07 KG/M2 | WEIGHT: 141 LBS | SYSTOLIC BLOOD PRESSURE: 107 MMHG | HEIGHT: 64 IN | DIASTOLIC BLOOD PRESSURE: 69 MMHG

## 2017-07-11 ENCOUNTER — APPOINTMENT (OUTPATIENT)
Dept: CARDIOLOGY | Facility: CLINIC | Age: 47
End: 2017-07-11

## 2017-07-11 VITALS
DIASTOLIC BLOOD PRESSURE: 67 MMHG | SYSTOLIC BLOOD PRESSURE: 107 MMHG | HEART RATE: 81 BPM | HEIGHT: 64 IN | OXYGEN SATURATION: 100 % | BODY MASS INDEX: 24.07 KG/M2 | WEIGHT: 141 LBS

## 2017-07-18 ENCOUNTER — NON-APPOINTMENT (OUTPATIENT)
Age: 47
End: 2017-07-18

## 2017-07-24 ENCOUNTER — APPOINTMENT (OUTPATIENT)
Dept: CARDIOLOGY | Facility: CLINIC | Age: 47
End: 2017-07-24

## 2017-07-25 ENCOUNTER — APPOINTMENT (OUTPATIENT)
Dept: RADIOLOGY | Facility: CLINIC | Age: 47
End: 2017-07-25

## 2017-07-25 ENCOUNTER — OUTPATIENT (OUTPATIENT)
Dept: OUTPATIENT SERVICES | Facility: HOSPITAL | Age: 47
LOS: 1 days | End: 2017-07-25
Payer: COMMERCIAL

## 2017-07-25 ENCOUNTER — APPOINTMENT (OUTPATIENT)
Dept: FAMILY MEDICINE | Facility: CLINIC | Age: 47
End: 2017-07-25

## 2017-07-25 VITALS
BODY MASS INDEX: 24.92 KG/M2 | WEIGHT: 146 LBS | HEART RATE: 81 BPM | OXYGEN SATURATION: 99 % | HEIGHT: 64 IN | SYSTOLIC BLOOD PRESSURE: 108 MMHG | DIASTOLIC BLOOD PRESSURE: 70 MMHG

## 2017-07-25 DIAGNOSIS — Z00.8 ENCOUNTER FOR OTHER GENERAL EXAMINATION: ICD-10-CM

## 2017-07-25 DIAGNOSIS — Z98.890 OTHER SPECIFIED POSTPROCEDURAL STATES: Chronic | ICD-10-CM

## 2017-07-25 DIAGNOSIS — M54.5 LOW BACK PAIN: ICD-10-CM

## 2017-07-25 PROCEDURE — 72110 X-RAY EXAM L-2 SPINE 4/>VWS: CPT

## 2017-07-27 NOTE — H&P PST ADULT - FAMILY HISTORY
None Grandparent  Still living? Unknown  CAD (coronary artery disease), Age at diagnosis: 61-70     Uncle  Still living? Yes, Estimated age: Age Unknown  CAD (coronary artery disease), Age at diagnosis: 51-60

## 2017-07-28 ENCOUNTER — MEDICATION RENEWAL (OUTPATIENT)
Age: 47
End: 2017-07-28

## 2017-08-03 ENCOUNTER — APPOINTMENT (OUTPATIENT)
Dept: CARDIOLOGY | Facility: CLINIC | Age: 47
End: 2017-08-03
Payer: COMMERCIAL

## 2017-08-03 PROCEDURE — 0399T: CPT

## 2017-08-03 PROCEDURE — 76376 3D RENDER W/INTRP POSTPROCES: CPT | Mod: 26

## 2017-08-03 PROCEDURE — 93306 TTE W/DOPPLER COMPLETE: CPT

## 2017-08-09 ENCOUNTER — RESULT REVIEW (OUTPATIENT)
Age: 47
End: 2017-08-09

## 2017-08-14 ENCOUNTER — APPOINTMENT (OUTPATIENT)
Dept: FAMILY MEDICINE | Facility: CLINIC | Age: 47
End: 2017-08-14
Payer: COMMERCIAL

## 2017-08-14 VITALS
OXYGEN SATURATION: 100 % | SYSTOLIC BLOOD PRESSURE: 114 MMHG | DIASTOLIC BLOOD PRESSURE: 72 MMHG | BODY MASS INDEX: 24.41 KG/M2 | HEIGHT: 64 IN | WEIGHT: 143 LBS | HEART RATE: 76 BPM

## 2017-08-14 PROCEDURE — 99214 OFFICE O/P EST MOD 30 MIN: CPT

## 2017-08-14 RX ORDER — METOPROLOL SUCCINATE 25 MG/1
25 TABLET, EXTENDED RELEASE ORAL DAILY
Qty: 90 | Refills: 2 | Status: DISCONTINUED | COMMUNITY
Start: 2017-07-28 | End: 2017-08-14

## 2017-08-21 ENCOUNTER — RX RENEWAL (OUTPATIENT)
Age: 47
End: 2017-08-21

## 2017-09-13 ENCOUNTER — APPOINTMENT (OUTPATIENT)
Dept: FAMILY MEDICINE | Facility: CLINIC | Age: 47
End: 2017-09-13
Payer: COMMERCIAL

## 2017-09-13 ENCOUNTER — LABORATORY RESULT (OUTPATIENT)
Age: 47
End: 2017-09-13

## 2017-09-13 VITALS
DIASTOLIC BLOOD PRESSURE: 74 MMHG | WEIGHT: 143 LBS | BODY MASS INDEX: 24.41 KG/M2 | OXYGEN SATURATION: 99 % | HEART RATE: 77 BPM | HEIGHT: 64 IN | SYSTOLIC BLOOD PRESSURE: 110 MMHG

## 2017-09-13 PROCEDURE — 36415 COLL VENOUS BLD VENIPUNCTURE: CPT

## 2017-09-13 PROCEDURE — 99396 PREV VISIT EST AGE 40-64: CPT | Mod: 25

## 2017-09-13 RX ORDER — ATORVASTATIN CALCIUM 20 MG/1
20 TABLET, FILM COATED ORAL
Qty: 90 | Refills: 2 | Status: COMPLETED | COMMUNITY
End: 2017-09-13

## 2017-09-14 LAB
APPEARANCE: CLEAR
BACTERIA: NEGATIVE
BASOPHILS # BLD AUTO: 0.03 K/UL
BASOPHILS NFR BLD AUTO: 0.4 %
BILIRUBIN URINE: NEGATIVE
BLOOD URINE: NEGATIVE
COLOR: YELLOW
EOSINOPHIL # BLD AUTO: 0.09 K/UL
EOSINOPHIL NFR BLD AUTO: 1.2 %
ERYTHROCYTE [SEDIMENTATION RATE] IN BLOOD BY WESTERGREN METHOD: 4 MM/HR
GLUCOSE QUALITATIVE U: NORMAL MG/DL
HBA1C MFR BLD HPLC: 4.9 %
HCT VFR BLD CALC: 40.2 %
HGB BLD-MCNC: 12.9 G/DL
IMM GRANULOCYTES NFR BLD AUTO: 0.1 %
KETONES URINE: NEGATIVE
LEUKOCYTE ESTERASE URINE: NEGATIVE
LYMPHOCYTES # BLD AUTO: 2.16 K/UL
LYMPHOCYTES NFR BLD AUTO: 28.3 %
MAN DIFF?: NORMAL
MCHC RBC-ENTMCNC: 28.5 PG
MCHC RBC-ENTMCNC: 32.1 GM/DL
MCV RBC AUTO: 88.7 FL
MICROSCOPIC-UA: NORMAL
MONOCYTES # BLD AUTO: 0.4 K/UL
MONOCYTES NFR BLD AUTO: 5.2 %
NEUTROPHILS # BLD AUTO: 4.95 K/UL
NEUTROPHILS NFR BLD AUTO: 64.8 %
NITRITE URINE: NEGATIVE
PH URINE: 7
PLATELET # BLD AUTO: 327 K/UL
PROTEIN URINE: NEGATIVE MG/DL
RBC # BLD: 4.53 M/UL
RBC # FLD: 13.5 %
RED BLOOD CELLS URINE: 0 /HPF
SPECIFIC GRAVITY URINE: 1
SQUAMOUS EPITHELIAL CELLS: 0 /HPF
UROBILINOGEN URINE: NORMAL MG/DL
WBC # FLD AUTO: 7.64 K/UL
WHITE BLOOD CELLS URINE: 0 /HPF

## 2017-09-18 LAB
25(OH)D3 SERPL-MCNC: 57.7 NG/ML
ALBUMIN SERPL ELPH-MCNC: 4.7 G/DL
ALP BLD-CCNC: 66 U/L
ALT SERPL-CCNC: 31 U/L
ANION GAP SERPL CALC-SCNC: 18 MMOL/L
AST SERPL-CCNC: 26 U/L
B BURGDOR IGG+IGM SER QL IB: NORMAL
BILIRUB SERPL-MCNC: 0.7 MG/DL
BUN SERPL-MCNC: 7 MG/DL
CALCIUM SERPL-MCNC: 9.8 MG/DL
CHLORIDE SERPL-SCNC: 103 MMOL/L
CHOLEST SERPL-MCNC: 146 MG/DL
CHOLEST/HDLC SERPL: 3.1 RATIO
CO2 SERPL-SCNC: 24 MMOL/L
CREAT SERPL-MCNC: 0.67 MG/DL
CRP SERPL-MCNC: 0.2 MG/DL
GLUCOSE SERPL-MCNC: 95 MG/DL
HDLC SERPL-MCNC: 47 MG/DL
LDLC SERPL CALC-MCNC: 73 MG/DL
POTASSIUM SERPL-SCNC: 4.6 MMOL/L
PROT SERPL-MCNC: 7.5 G/DL
RHEUMATOID FACT SER QL: 89 IU/ML
SODIUM SERPL-SCNC: 145 MMOL/L
TRIGL SERPL-MCNC: 130 MG/DL
TSH SERPL-ACNC: 0.87 UIU/ML
URATE SERPL-MCNC: 4.9 MG/DL

## 2017-09-28 ENCOUNTER — APPOINTMENT (OUTPATIENT)
Dept: MRI IMAGING | Facility: CLINIC | Age: 47
End: 2017-09-28

## 2017-09-28 LAB
ANA PAT FLD IF-IMP: ABNORMAL
ANA PATTERN: ABNORMAL
ANA SER IF-ACNC: ABNORMAL
ANA TITER: ABNORMAL

## 2017-09-29 ENCOUNTER — RX RENEWAL (OUTPATIENT)
Age: 47
End: 2017-09-29

## 2017-10-02 PROBLEM — Z00.00 ENCOUNTER FOR PREVENTIVE HEALTH EXAMINATION: Noted: 2017-10-02

## 2017-10-09 ENCOUNTER — APPOINTMENT (OUTPATIENT)
Dept: ULTRASOUND IMAGING | Facility: CLINIC | Age: 47
End: 2017-10-09

## 2017-10-09 ENCOUNTER — APPOINTMENT (OUTPATIENT)
Dept: MAMMOGRAPHY | Facility: CLINIC | Age: 47
End: 2017-10-09

## 2017-10-11 ENCOUNTER — APPOINTMENT (OUTPATIENT)
Dept: RHEUMATOLOGY | Facility: CLINIC | Age: 47
End: 2017-10-11
Payer: COMMERCIAL

## 2017-10-11 VITALS
HEIGHT: 64 IN | BODY MASS INDEX: 23.9 KG/M2 | TEMPERATURE: 98.3 F | WEIGHT: 140 LBS | OXYGEN SATURATION: 98 % | SYSTOLIC BLOOD PRESSURE: 96 MMHG | DIASTOLIC BLOOD PRESSURE: 62 MMHG | HEART RATE: 81 BPM

## 2017-10-11 DIAGNOSIS — I25.2 OLD MYOCARDIAL INFARCTION: ICD-10-CM

## 2017-10-11 DIAGNOSIS — G89.29 LUMBAGO WITH SCIATICA, LEFT SIDE: ICD-10-CM

## 2017-10-11 DIAGNOSIS — M25.50 PAIN IN UNSPECIFIED JOINT: ICD-10-CM

## 2017-10-11 DIAGNOSIS — Z82.69 FAMILY HISTORY OF OTHER DISEASES OF THE MUSCULOSKELETAL SYSTEM AND CONNECTIVE TISSUE: ICD-10-CM

## 2017-10-11 DIAGNOSIS — Z78.9 OTHER SPECIFIED HEALTH STATUS: ICD-10-CM

## 2017-10-11 DIAGNOSIS — R76.8 OTHER SPECIFIED ABNORMAL IMMUNOLOGICAL FINDINGS IN SERUM: ICD-10-CM

## 2017-10-11 DIAGNOSIS — M54.41 LUMBAGO WITH SCIATICA, LEFT SIDE: ICD-10-CM

## 2017-10-11 DIAGNOSIS — M54.42 LUMBAGO WITH SCIATICA, LEFT SIDE: ICD-10-CM

## 2017-10-11 PROCEDURE — 99204 OFFICE O/P NEW MOD 45 MIN: CPT

## 2017-10-23 ENCOUNTER — MEDICATION RENEWAL (OUTPATIENT)
Age: 47
End: 2017-10-23

## 2017-10-25 ENCOUNTER — APPOINTMENT (OUTPATIENT)
Dept: RHEUMATOLOGY | Facility: CLINIC | Age: 47
End: 2017-10-25

## 2017-10-27 ENCOUNTER — APPOINTMENT (OUTPATIENT)
Dept: FAMILY MEDICINE | Facility: CLINIC | Age: 47
End: 2017-10-27

## 2017-11-13 ENCOUNTER — RX RENEWAL (OUTPATIENT)
Age: 47
End: 2017-11-13

## 2017-12-19 ENCOUNTER — APPOINTMENT (OUTPATIENT)
Dept: CARDIOLOGY | Facility: CLINIC | Age: 47
End: 2017-12-19

## 2017-12-19 ENCOUNTER — APPOINTMENT (OUTPATIENT)
Dept: CARDIOLOGY | Facility: CLINIC | Age: 47
End: 2017-12-19
Payer: COMMERCIAL

## 2017-12-19 ENCOUNTER — NON-APPOINTMENT (OUTPATIENT)
Age: 47
End: 2017-12-19

## 2017-12-19 VITALS
HEART RATE: 88 BPM | OXYGEN SATURATION: 98 % | DIASTOLIC BLOOD PRESSURE: 69 MMHG | WEIGHT: 140 LBS | SYSTOLIC BLOOD PRESSURE: 102 MMHG | BODY MASS INDEX: 23.9 KG/M2 | HEIGHT: 64 IN

## 2017-12-19 PROCEDURE — 93000 ELECTROCARDIOGRAM COMPLETE: CPT

## 2017-12-19 PROCEDURE — 99214 OFFICE O/P EST MOD 30 MIN: CPT | Mod: 25

## 2017-12-19 RX ORDER — ATORVASTATIN CALCIUM 20 MG/1
20 TABLET, FILM COATED ORAL
Qty: 90 | Refills: 2 | Status: DISCONTINUED | COMMUNITY
Start: 2017-07-28 | End: 2017-12-19

## 2017-12-19 RX ORDER — FLUOXETINE HYDROCHLORIDE 20 MG/1
20 CAPSULE ORAL
Qty: 30 | Refills: 2 | Status: DISCONTINUED | COMMUNITY
Start: 2017-09-13 | End: 2017-12-19

## 2017-12-24 ENCOUNTER — RX RENEWAL (OUTPATIENT)
Age: 47
End: 2017-12-24

## 2018-01-08 ENCOUNTER — APPOINTMENT (OUTPATIENT)
Dept: CARDIOLOGY | Facility: CLINIC | Age: 48
End: 2018-01-08
Payer: COMMERCIAL

## 2018-01-08 ENCOUNTER — NON-APPOINTMENT (OUTPATIENT)
Age: 48
End: 2018-01-08

## 2018-01-08 VITALS
DIASTOLIC BLOOD PRESSURE: 61 MMHG | BODY MASS INDEX: 23.9 KG/M2 | HEIGHT: 64 IN | WEIGHT: 140 LBS | SYSTOLIC BLOOD PRESSURE: 100 MMHG | OXYGEN SATURATION: 100 %

## 2018-01-08 PROCEDURE — 93000 ELECTROCARDIOGRAM COMPLETE: CPT

## 2018-01-08 PROCEDURE — 99215 OFFICE O/P EST HI 40 MIN: CPT

## 2018-01-08 RX ORDER — GABAPENTIN 100 MG/1
100 CAPSULE ORAL
Qty: 180 | Refills: 1 | Status: DISCONTINUED | COMMUNITY
Start: 2017-08-14 | End: 2018-01-08

## 2018-01-11 ENCOUNTER — RX RENEWAL (OUTPATIENT)
Age: 48
End: 2018-01-11

## 2018-01-12 ENCOUNTER — APPOINTMENT (OUTPATIENT)
Dept: CARDIOLOGY | Facility: CLINIC | Age: 48
End: 2018-01-12

## 2018-02-07 ENCOUNTER — RX RENEWAL (OUTPATIENT)
Age: 48
End: 2018-02-07

## 2018-05-14 ENCOUNTER — RX RENEWAL (OUTPATIENT)
Age: 48
End: 2018-05-14

## 2018-06-13 ENCOUNTER — NON-APPOINTMENT (OUTPATIENT)
Age: 48
End: 2018-06-13

## 2018-06-13 ENCOUNTER — APPOINTMENT (OUTPATIENT)
Dept: FAMILY MEDICINE | Facility: CLINIC | Age: 48
End: 2018-06-13
Payer: COMMERCIAL

## 2018-06-13 VITALS
WEIGHT: 143 LBS | BODY MASS INDEX: 24.41 KG/M2 | HEIGHT: 64 IN | DIASTOLIC BLOOD PRESSURE: 68 MMHG | TEMPERATURE: 98.2 F | HEART RATE: 78 BPM | OXYGEN SATURATION: 98 % | SYSTOLIC BLOOD PRESSURE: 104 MMHG

## 2018-06-13 DIAGNOSIS — M79.669 PAIN IN UNSPECIFIED LOWER LEG: ICD-10-CM

## 2018-06-13 DIAGNOSIS — R53.83 OTHER FATIGUE: ICD-10-CM

## 2018-06-13 DIAGNOSIS — R21 RASH AND OTHER NONSPECIFIC SKIN ERUPTION: ICD-10-CM

## 2018-06-13 DIAGNOSIS — R06.09 OTHER FORMS OF DYSPNEA: ICD-10-CM

## 2018-06-13 PROCEDURE — 36415 COLL VENOUS BLD VENIPUNCTURE: CPT

## 2018-06-13 PROCEDURE — 93000 ELECTROCARDIOGRAM COMPLETE: CPT

## 2018-06-13 PROCEDURE — 99214 OFFICE O/P EST MOD 30 MIN: CPT | Mod: 25

## 2018-06-13 RX ORDER — NABUMETONE 750 MG/1
750 TABLET, FILM COATED ORAL TWICE DAILY
Qty: 180 | Refills: 0 | Status: DISCONTINUED | COMMUNITY
Start: 2017-07-25 | End: 2018-06-13

## 2018-06-13 RX ORDER — CLINDAMYCIN HYDROCHLORIDE 150 MG/1
150 CAPSULE ORAL
Qty: 28 | Refills: 0 | Status: DISCONTINUED | COMMUNITY
Start: 2018-04-17

## 2018-06-14 ENCOUNTER — APPOINTMENT (OUTPATIENT)
Dept: FAMILY MEDICINE | Facility: CLINIC | Age: 48
End: 2018-06-14

## 2018-06-14 LAB
25(OH)D3 SERPL-MCNC: 40.8 NG/ML
ALBUMIN SERPL ELPH-MCNC: 4.7 G/DL
ALP BLD-CCNC: 57 U/L
ALT SERPL-CCNC: 12 U/L
ANION GAP SERPL CALC-SCNC: 15 MMOL/L
AST SERPL-CCNC: 20 U/L
BASOPHILS # BLD AUTO: 0.03 K/UL
BASOPHILS NFR BLD AUTO: 0.4 %
BILIRUB SERPL-MCNC: 0.2 MG/DL
BUN SERPL-MCNC: 10 MG/DL
CALCIUM SERPL-MCNC: 9.6 MG/DL
CHLORIDE SERPL-SCNC: 104 MMOL/L
CO2 SERPL-SCNC: 25 MMOL/L
CREAT SERPL-MCNC: 0.68 MG/DL
EOSINOPHIL # BLD AUTO: 0.19 K/UL
EOSINOPHIL NFR BLD AUTO: 2.8 %
FOLATE SERPL-MCNC: 19.2 NG/ML
GLUCOSE SERPL-MCNC: 87 MG/DL
HCT VFR BLD CALC: 38.9 %
HGB BLD-MCNC: 12.9 G/DL
IMM GRANULOCYTES NFR BLD AUTO: 0.1 %
IRON SATN MFR SERPL: 18 %
IRON SERPL-MCNC: 52 UG/DL
LYMPHOCYTES # BLD AUTO: 2.57 K/UL
LYMPHOCYTES NFR BLD AUTO: 37.8 %
MAN DIFF?: NORMAL
MCHC RBC-ENTMCNC: 29.5 PG
MCHC RBC-ENTMCNC: 33.2 GM/DL
MCV RBC AUTO: 88.8 FL
MONOCYTES # BLD AUTO: 0.34 K/UL
MONOCYTES NFR BLD AUTO: 5 %
NEUTROPHILS # BLD AUTO: 3.65 K/UL
NEUTROPHILS NFR BLD AUTO: 53.9 %
PLATELET # BLD AUTO: 293 K/UL
POTASSIUM SERPL-SCNC: 4.5 MMOL/L
PROT SERPL-MCNC: 6.9 G/DL
RBC # BLD: 4.38 M/UL
RBC # FLD: 13 %
SODIUM SERPL-SCNC: 144 MMOL/L
TIBC SERPL-MCNC: 296 UG/DL
TSH SERPL-ACNC: 1.59 UIU/ML
UIBC SERPL-MCNC: 244 UG/DL
VIT B12 SERPL-MCNC: 560 PG/ML
WBC # FLD AUTO: 6.79 K/UL

## 2018-07-10 ENCOUNTER — APPOINTMENT (OUTPATIENT)
Dept: FAMILY MEDICINE | Facility: CLINIC | Age: 48
End: 2018-07-10
Payer: COMMERCIAL

## 2018-07-10 VITALS
SYSTOLIC BLOOD PRESSURE: 110 MMHG | BODY MASS INDEX: 24.92 KG/M2 | HEART RATE: 69 BPM | DIASTOLIC BLOOD PRESSURE: 72 MMHG | WEIGHT: 146 LBS | HEIGHT: 64 IN | OXYGEN SATURATION: 98 %

## 2018-07-10 DIAGNOSIS — M79.1 MYALGIA: ICD-10-CM

## 2018-07-10 DIAGNOSIS — R76.8 OTHER SPECIFIED ABNORMAL IMMUNOLOGICAL FINDINGS IN SERUM: ICD-10-CM

## 2018-07-10 PROCEDURE — 99214 OFFICE O/P EST MOD 30 MIN: CPT

## 2018-07-11 NOTE — HISTORY OF PRESENT ILLNESS
[FreeTextEntry1] : Patient here for follow up  [de-identified] : 48 year old female here for follow up\par went to ER after last visit here for dypnea and chest pain\par negative work up\par she scheduled a stress echo with Dr. Montanez later this month\par she continues to complain of fatigue\par

## 2018-07-16 ENCOUNTER — APPOINTMENT (OUTPATIENT)
Dept: CARDIOLOGY | Facility: CLINIC | Age: 48
End: 2018-07-16
Payer: COMMERCIAL

## 2018-07-16 PROBLEM — K57.92 DIVERTICULITIS OF INTESTINE, PART UNSPECIFIED, WITHOUT PERFORATION OR ABSCESS WITHOUT BLEEDING: Chronic | Status: ACTIVE | Noted: 2017-06-27

## 2018-07-16 PROBLEM — N94.6 DYSMENORRHEA, UNSPECIFIED: Chronic | Status: ACTIVE | Noted: 2017-06-27

## 2018-07-16 PROBLEM — M54.9 DORSALGIA, UNSPECIFIED: Chronic | Status: ACTIVE | Noted: 2017-06-27

## 2018-07-16 PROBLEM — F41.9 ANXIETY DISORDER, UNSPECIFIED: Chronic | Status: ACTIVE | Noted: 2017-06-27

## 2018-07-16 PROCEDURE — 93320 DOPPLER ECHO COMPLETE: CPT

## 2018-07-16 PROCEDURE — 93325 DOPPLER ECHO COLOR FLOW MAPG: CPT

## 2018-07-16 PROCEDURE — 93351 STRESS TTE COMPLETE: CPT

## 2018-08-14 ENCOUNTER — NON-APPOINTMENT (OUTPATIENT)
Age: 48
End: 2018-08-14

## 2018-08-14 ENCOUNTER — APPOINTMENT (OUTPATIENT)
Dept: CARDIOLOGY | Facility: CLINIC | Age: 48
End: 2018-08-14
Payer: COMMERCIAL

## 2018-08-14 VITALS
BODY MASS INDEX: 24.07 KG/M2 | HEIGHT: 64 IN | DIASTOLIC BLOOD PRESSURE: 60 MMHG | HEART RATE: 80 BPM | SYSTOLIC BLOOD PRESSURE: 90 MMHG | WEIGHT: 141 LBS | OXYGEN SATURATION: 97 %

## 2018-08-14 VITALS — SYSTOLIC BLOOD PRESSURE: 98 MMHG | DIASTOLIC BLOOD PRESSURE: 64 MMHG

## 2018-08-14 PROCEDURE — 99215 OFFICE O/P EST HI 40 MIN: CPT

## 2018-08-14 PROCEDURE — 93000 ELECTROCARDIOGRAM COMPLETE: CPT

## 2018-08-14 RX ORDER — METOPROLOL TARTRATE 25 MG/1
25 TABLET, FILM COATED ORAL
Refills: 0 | Status: DISCONTINUED | COMMUNITY
End: 2018-08-14

## 2018-08-14 RX ORDER — ASPIRIN 325 MG/1
325 TABLET, FILM COATED ORAL
Refills: 0 | Status: DISCONTINUED | COMMUNITY
End: 2018-08-14

## 2018-09-14 ENCOUNTER — RX RENEWAL (OUTPATIENT)
Age: 48
End: 2018-09-14

## 2018-10-17 ENCOUNTER — APPOINTMENT (OUTPATIENT)
Dept: FAMILY MEDICINE | Facility: CLINIC | Age: 48
End: 2018-10-17

## 2018-12-26 ENCOUNTER — RX RENEWAL (OUTPATIENT)
Age: 48
End: 2018-12-26

## 2019-01-23 ENCOUNTER — RX RENEWAL (OUTPATIENT)
Age: 49
End: 2019-01-23

## 2019-01-25 ENCOUNTER — LABORATORY RESULT (OUTPATIENT)
Age: 49
End: 2019-01-25

## 2019-01-25 ENCOUNTER — APPOINTMENT (OUTPATIENT)
Dept: FAMILY MEDICINE | Facility: CLINIC | Age: 49
End: 2019-01-25
Payer: COMMERCIAL

## 2019-01-25 VITALS
OXYGEN SATURATION: 97 % | WEIGHT: 142 LBS | BODY MASS INDEX: 24.24 KG/M2 | SYSTOLIC BLOOD PRESSURE: 110 MMHG | HEIGHT: 64 IN | HEART RATE: 74 BPM | DIASTOLIC BLOOD PRESSURE: 76 MMHG

## 2019-01-25 PROCEDURE — 36415 COLL VENOUS BLD VENIPUNCTURE: CPT

## 2019-01-25 PROCEDURE — 99396 PREV VISIT EST AGE 40-64: CPT | Mod: 25

## 2019-01-25 RX ORDER — FLUTICASONE PROPIONATE 50 UG/1
50 SPRAY, METERED NASAL DAILY
Qty: 16 | Refills: 1 | Status: DISCONTINUED | COMMUNITY
Start: 2017-02-03 | End: 2019-01-25

## 2019-01-25 RX ORDER — CYCLOBENZAPRINE HCL 5 MG
TABLET ORAL
Refills: 0 | Status: DISCONTINUED | COMMUNITY
End: 2019-01-25

## 2019-01-25 RX ORDER — TRIAMCINOLONE ACETONIDE 1 MG/G
0.1 CREAM TOPICAL TWICE DAILY
Qty: 1 | Refills: 0 | Status: COMPLETED | COMMUNITY
Start: 2018-06-13 | End: 2019-01-25

## 2019-01-25 RX ORDER — ATORVASTATIN CALCIUM 10 MG/1
10 TABLET, FILM COATED ORAL
Qty: 90 | Refills: 3 | Status: DISCONTINUED | COMMUNITY
End: 2019-01-25

## 2019-01-25 RX ORDER — ATORVASTATIN CALCIUM 20 MG/1
20 TABLET, FILM COATED ORAL
Refills: 0 | Status: DISCONTINUED | COMMUNITY
End: 2019-01-25

## 2019-01-28 ENCOUNTER — TRANSCRIPTION ENCOUNTER (OUTPATIENT)
Age: 49
End: 2019-01-28

## 2019-01-28 NOTE — HISTORY OF PRESENT ILLNESS
[FreeTextEntry1] : CPE, no complaints  [de-identified] : got IUD about 6 months [facility] for heavy periods\par \par otherwise is doing well\par saw cardio in august (normal)\par \par

## 2019-01-28 NOTE — HEALTH RISK ASSESSMENT
[0] : 2) Feeling down, depressed, or hopeless: Not at all (0) [Patient reported mammogram was normal] : Patient reported mammogram was normal [Patient reported PAP Smear was normal] : Patient reported PAP Smear was normal [MammogramDate] : 01/15 [MammogramComments] : Needs rx [PapSmearDate] : 10/18 [PapSmearComments] : Dr. Richardson

## 2019-01-30 LAB
25(OH)D3 SERPL-MCNC: 45.6 NG/ML
ALBUMIN SERPL ELPH-MCNC: 5 G/DL
ALP BLD-CCNC: 62 U/L
ALT SERPL-CCNC: 22 U/L
ANA PAT FLD IF-IMP: NORMAL
ANA SER IF-ACNC: ABNORMAL
ANION GAP SERPL CALC-SCNC: 16 MMOL/L
AST SERPL-CCNC: 29 U/L
B BURGDOR IGG+IGM SER QL IB: NORMAL
BASOPHILS # BLD AUTO: 0.04 K/UL
BASOPHILS NFR BLD AUTO: 0.5 %
BILIRUB SERPL-MCNC: 0.5 MG/DL
BUN SERPL-MCNC: 7 MG/DL
CALCIUM SERPL-MCNC: 9.9 MG/DL
CHLORIDE SERPL-SCNC: 102 MMOL/L
CHOLEST SERPL-MCNC: 215 MG/DL
CHOLEST/HDLC SERPL: 4.7 RATIO
CO2 SERPL-SCNC: 21 MMOL/L
CREAT SERPL-MCNC: 0.64 MG/DL
CRP SERPL-MCNC: 0.17 MG/DL
EOSINOPHIL # BLD AUTO: 0.13 K/UL
EOSINOPHIL NFR BLD AUTO: 1.5 %
ERYTHROCYTE [SEDIMENTATION RATE] IN BLOOD BY WESTERGREN METHOD: 7 MM/HR
GLUCOSE SERPL-MCNC: 85 MG/DL
HBA1C MFR BLD HPLC: 5 %
HCT VFR BLD CALC: 42.2 %
HDLC SERPL-MCNC: 46 MG/DL
HGB BLD-MCNC: 13.7 G/DL
IMM GRANULOCYTES NFR BLD AUTO: 0.2 %
LDLC SERPL CALC-MCNC: 142 MG/DL
LYMPHOCYTES # BLD AUTO: 3.13 K/UL
LYMPHOCYTES NFR BLD AUTO: 36.6 %
MAN DIFF?: NORMAL
MCHC RBC-ENTMCNC: 28.9 PG
MCHC RBC-ENTMCNC: 32.5 GM/DL
MCV RBC AUTO: 89 FL
MONOCYTES # BLD AUTO: 0.38 K/UL
MONOCYTES NFR BLD AUTO: 4.4 %
NEUTROPHILS # BLD AUTO: 4.85 K/UL
NEUTROPHILS NFR BLD AUTO: 56.8 %
PLATELET # BLD AUTO: 356 K/UL
POTASSIUM SERPL-SCNC: 4.3 MMOL/L
PROT SERPL-MCNC: 7.4 G/DL
RBC # BLD: 4.74 M/UL
RBC # FLD: 13.3 %
RHEUMATOID FACT SER QL: 81 IU/ML
SODIUM SERPL-SCNC: 139 MMOL/L
TRIGL SERPL-MCNC: 136 MG/DL
TSH SERPL-ACNC: 1.08 UIU/ML
URATE SERPL-MCNC: 4.5 MG/DL
WBC # FLD AUTO: 8.55 K/UL

## 2019-02-25 ENCOUNTER — TRANSCRIPTION ENCOUNTER (OUTPATIENT)
Age: 49
End: 2019-02-25

## 2019-04-03 ENCOUNTER — RX RENEWAL (OUTPATIENT)
Age: 49
End: 2019-04-03

## 2019-04-05 ENCOUNTER — APPOINTMENT (OUTPATIENT)
Dept: CARDIOLOGY | Facility: CLINIC | Age: 49
End: 2019-04-05
Payer: COMMERCIAL

## 2019-04-05 VITALS
OXYGEN SATURATION: 100 % | WEIGHT: 140 LBS | SYSTOLIC BLOOD PRESSURE: 109 MMHG | DIASTOLIC BLOOD PRESSURE: 73 MMHG | HEIGHT: 64 IN | HEART RATE: 66 BPM | BODY MASS INDEX: 23.9 KG/M2

## 2019-04-05 DIAGNOSIS — Z01.810 ENCOUNTER FOR PREPROCEDURAL CARDIOVASCULAR EXAMINATION: ICD-10-CM

## 2019-04-05 PROCEDURE — 99214 OFFICE O/P EST MOD 30 MIN: CPT

## 2019-04-05 NOTE — PHYSICAL EXAM
[General Appearance - Well Developed] : well developed [Normal Appearance] : normal appearance [Well Groomed] : well groomed [General Appearance - Well Nourished] : well nourished [No Deformities] : no deformities [General Appearance - In No Acute Distress] : no acute distress [Normal Conjunctiva] : the conjunctiva exhibited no abnormalities [Eyelids - No Xanthelasma] : the eyelids demonstrated no xanthelasmas [Normal Oral Mucosa] : normal oral mucosa [No Oral Pallor] : no oral pallor [No Oral Cyanosis] : no oral cyanosis [Respiration, Rhythm And Depth] : normal respiratory rhythm and effort [Exaggerated Use Of Accessory Muscles For Inspiration] : no accessory muscle use [Auscultation Breath Sounds / Voice Sounds] : lungs were clear to auscultation bilaterally [Heart Rate And Rhythm] : heart rate and rhythm were normal [Heart Sounds] : normal S1 and S2 [Murmurs] : no murmurs present [Arterial Pulses Normal] : the arterial pulses were normal [Edema] : no peripheral edema present [Abdomen Soft] : soft [Abdomen Tenderness] : non-tender [Abdomen Mass (___ Cm)] : no abdominal mass palpated [Abnormal Walk] : normal gait [Gait - Sufficient For Exercise Testing] : the gait was sufficient for exercise testing [Nail Clubbing] : no clubbing of the fingernails [Cyanosis, Localized] : no localized cyanosis [Petechial Hemorrhages (___cm)] : no petechial hemorrhages [Skin Color & Pigmentation] : normal skin color and pigmentation [] : no rash [No Venous Stasis] : no venous stasis [Skin Lesions] : no skin lesions [No Skin Ulcers] : no skin ulcer [No Xanthoma] : no  xanthoma was observed [Oriented To Time, Place, And Person] : oriented to person, place, and time [Impaired Insight] : insight and judgment were intact [Affect] : the affect was normal [Mood] : the mood was normal [No Anxiety] : not feeling anxious

## 2019-04-08 ENCOUNTER — APPOINTMENT (OUTPATIENT)
Dept: FAMILY MEDICINE | Facility: CLINIC | Age: 49
End: 2019-04-08
Payer: COMMERCIAL

## 2019-04-08 VITALS
HEART RATE: 70 BPM | HEIGHT: 64 IN | WEIGHT: 143 LBS | BODY MASS INDEX: 24.41 KG/M2 | OXYGEN SATURATION: 99 % | SYSTOLIC BLOOD PRESSURE: 108 MMHG | DIASTOLIC BLOOD PRESSURE: 70 MMHG

## 2019-04-08 DIAGNOSIS — Z01.818 ENCOUNTER FOR OTHER PREPROCEDURAL EXAMINATION: ICD-10-CM

## 2019-04-08 DIAGNOSIS — Z41.1 ENCOUNTER FOR COSMETIC SURGERY: ICD-10-CM

## 2019-04-08 PROBLEM — Z01.810 PREOP CARDIOVASCULAR EXAM: Status: ACTIVE | Noted: 2019-04-08

## 2019-04-08 PROCEDURE — 99214 OFFICE O/P EST MOD 30 MIN: CPT

## 2019-04-08 RX ORDER — AMOXICILLIN 875 MG/1
875 TABLET, FILM COATED ORAL
Qty: 10 | Refills: 0 | Status: COMPLETED | COMMUNITY
Start: 2019-01-03

## 2019-04-08 RX ORDER — AMOXICILLIN 500 MG/1
500 CAPSULE ORAL
Qty: 21 | Refills: 0 | Status: COMPLETED | COMMUNITY
Start: 2018-11-08

## 2019-04-08 RX ORDER — IBUPROFEN 600 MG/1
600 TABLET, FILM COATED ORAL
Qty: 15 | Refills: 0 | Status: COMPLETED | COMMUNITY
Start: 2019-01-03

## 2019-04-08 RX ORDER — CHLORHEXIDINE GLUCONATE, 0.12% ORAL RINSE 1.2 MG/ML
0.12 SOLUTION DENTAL
Qty: 473 | Refills: 0 | Status: COMPLETED | COMMUNITY
Start: 2019-01-03

## 2019-04-08 RX ORDER — ENOXAPARIN SODIUM 100 MG/ML
40 INJECTION SUBCUTANEOUS
Qty: 4 | Refills: 0 | Status: COMPLETED | COMMUNITY
Start: 2019-04-04

## 2019-04-08 NOTE — HISTORY OF PRESENT ILLNESS
[No Pertinent Cardiac History] : no history of aortic stenosis, atrial fibrillation, coronary artery disease, recent myocardial infarction, or implantable device/pacemaker [No Pertinent Pulmonary History] : no history of asthma, COPD, sleep apnea, or smoking [No Adverse Anesthesia Reaction] : no adverse anesthesia reaction in self or family member [Chronic Anticoagulation] : no chronic anticoagulation [Chronic Kidney Disease] : no chronic kidney disease [Diabetes] : no diabetes [(Patient denies any chest pain, claudication, dyspnea on exertion, orthopnea, palpitations or syncope)] : Patient denies any chest pain, claudication, dyspnea on exertion, orthopnea, palpitations or syncope [FreeTextEntry1] : breast augmentation  [FreeTextEntry2] : 4/10/19 [FreeTextEntry3] :  [FreeTextEntry7] : echo-7/18\par stress-7/18\par cath-7/17\par

## 2019-04-08 NOTE — REVIEW OF SYSTEMS
[see HPI] : see HPI [Shortness Of Breath] : shortness of breath [Dyspnea on exertion] : dyspnea during exertion [Chest Pain] : chest pain [Palpitations] : palpitations [Negative] : Heme/Lymph [Chest  Pressure] : no chest pressure [Lower Ext Edema] : no extremity edema

## 2019-04-08 NOTE — ASSESSMENT
[Patient Optimized for Surgery] : Patient optimized for surgery [Modify anti-platelet treatment prior to procedure] : Modify anti-platelet treatment prior to procedure [As per surgery] : as per surgery [FreeTextEntry4] : 48 year old in stable health. Recieved cardiac clearance from Dr. Kandi Rae.  [FreeTextEntry6] : on hol for 1 week prior to procedure

## 2019-04-08 NOTE — DISCUSSION/SUMMARY
[FreeTextEntry1] : Pt is a 49 y/o F who presents today for cardiac evaluation prior to breast augmentation.  She currently feels well and has no cardiac complaints\par ECG NSR low voltage - no changes\par TTE 07/2018 08/2017 EF 65%, mild MR\par Stress echo 07/2018 showed normal augmentation, exercise time 10min 30 sec\par At this time there are no cardiac contraindications for planned surgery\par The described plan was discussed with the pt.  All questions and concerns were addressed to the best of my knowledge.  I encouraged her to call me with any problems which may arise\par

## 2019-04-08 NOTE — HISTORY OF PRESENT ILLNESS
[FreeTextEntry1] : Pt is a 47 y/o F who presents today for cardiac evaluation prior to breast augmentation 4/10/2019 at [facility].  She has PMH of Takotsubo's cardiomyopathy diagnosed 07/2017 at [facility], cardiac cath done by Dr. Montanez which showed normal coronary arteries but apical hypokinesis suspicious for stress induced CM.  TTE done post cath showed normal LV function.   \par She states that she has been feeling well and currently has no cardiac complaints.  She denies CP, SOB, diaphoresis, palpitations, dizziness, syncope, LE edema, PND.  \par TTE 07/2018 08/2017 EF 65%, mild MR\par Stress echo 07/2018 showed normal augmentation, exercise time 10min 30 sec

## 2019-04-09 ENCOUNTER — TRANSCRIPTION ENCOUNTER (OUTPATIENT)
Age: 49
End: 2019-04-09

## 2019-04-23 ENCOUNTER — APPOINTMENT (OUTPATIENT)
Dept: CARDIOLOGY | Facility: CLINIC | Age: 49
End: 2019-04-23

## 2019-06-03 ENCOUNTER — RX RENEWAL (OUTPATIENT)
Age: 49
End: 2019-06-03

## 2019-06-06 ENCOUNTER — APPOINTMENT (OUTPATIENT)
Dept: FAMILY MEDICINE | Facility: CLINIC | Age: 49
End: 2019-06-06
Payer: COMMERCIAL

## 2019-06-06 VITALS
BODY MASS INDEX: 23.9 KG/M2 | WEIGHT: 140 LBS | HEIGHT: 64 IN | DIASTOLIC BLOOD PRESSURE: 82 MMHG | SYSTOLIC BLOOD PRESSURE: 106 MMHG | HEART RATE: 81 BPM | OXYGEN SATURATION: 96 %

## 2019-06-06 VITALS — DIASTOLIC BLOOD PRESSURE: 58 MMHG | SYSTOLIC BLOOD PRESSURE: 98 MMHG

## 2019-06-06 PROCEDURE — 99214 OFFICE O/P EST MOD 30 MIN: CPT

## 2019-06-06 NOTE — ADDENDUM
[FreeTextEntry1] : I, Gloria Dinero acting as a scribe for Dr. Molly Vasques on Jun 06, 2019  at 1:01 PM\par

## 2019-06-06 NOTE — HISTORY OF PRESENT ILLNESS
[FreeTextEntry8] : LYNN is a 48 year female here for new complaints of dizziness and lightheadedness since last night at 10:00 pm. Had an episode of difficulty breathing last night while laying supine which resolved by this morning.  Denies room spinning, nausea, vision changes. Posterior headache. Of note, the patient had an IUD placed July 2018, irregular periods. She denies a hx of menstrual migraines. She reports baseline stress and difficulty sleeping. \par \par She had a breast augmentation in 4/10/19 and was being followed post operative weekly for suspected skin infection and residual scar tissue. Unhappy with the outcome of her surgery. She went to her dermatologist for second opinion and was incidental found to have multiple moles and underwent 3 skin biopsy on her RUQ and is now scheduled to see surgery.

## 2019-06-06 NOTE — PLAN
[FreeTextEntry1] : \par - Increase hydration, increase salt intake\par - Discussed ways to optimize sleep, meditation \par - Monitor menstrual symptoms\par - Discontinue metoprolol 25 mg and folow up with cardio\par - Contact information provided to plastic surgery \par

## 2019-06-06 NOTE — PHYSICAL EXAM
[No Acute Distress] : no acute distress [Well Nourished] : well nourished [Well Developed] : well developed [Well-Appearing] : well-appearing [Normal Sclera/Conjunctiva] : normal sclera/conjunctiva [PERRL] : pupils equal round and reactive to light [EOMI] : extraocular movements intact [Normal Outer Ear/Nose] : the outer ears and nose were normal in appearance [Normal Oropharynx] : the oropharynx was normal [No JVD] : no jugular venous distention [Supple] : supple [Thyroid Normal, No Nodules] : the thyroid was normal and there were no nodules present [No Lymphadenopathy] : no lymphadenopathy [No Respiratory Distress] : no respiratory distress  [Clear to Auscultation] : lungs were clear to auscultation bilaterally [No Accessory Muscle Use] : no accessory muscle use [Normal Rate] : normal rate  [Normal S1, S2] : normal S1 and S2 [Regular Rhythm] : with a regular rhythm [No Murmur] : no murmur heard [No Abdominal Bruit] : a ~M bruit was not heard ~T in the abdomen [No Carotid Bruits] : no carotid bruits [No Varicosities] : no varicosities [Pedal Pulses Present] : the pedal pulses are present [No Edema] : there was no peripheral edema [No Extremity Clubbing/Cyanosis] : no extremity clubbing/cyanosis [Soft] : abdomen soft [No Palpable Aorta] : no palpable aorta [Non Tender] : non-tender [Non-distended] : non-distended [No Masses] : no abdominal mass palpated [Normal Bowel Sounds] : normal bowel sounds [No HSM] : no HSM [Normal Posterior Cervical Nodes] : no posterior cervical lymphadenopathy [Normal Anterior Cervical Nodes] : no anterior cervical lymphadenopathy [No CVA Tenderness] : no CVA  tenderness [No Spinal Tenderness] : no spinal tenderness [No Joint Swelling] : no joint swelling [Grossly Normal Strength/Tone] : grossly normal strength/tone [No Rash] : no rash [Normal Gait] : normal gait [Coordination Grossly Intact] : coordination grossly intact [No Focal Deficits] : no focal deficits [Deep Tendon Reflexes (DTR)] : deep tendon reflexes were 2+ and symmetric [Normal Affect] : the affect was normal [Normal Insight/Judgement] : insight and judgment were intact

## 2019-06-06 NOTE — REVIEW OF SYSTEMS
[Headache] : headache [Dizziness] : dizziness [Negative] : Heme/Lymph [de-identified] : lightheadedness

## 2019-06-06 NOTE — END OF VISIT
[FreeTextEntry3] : Medical record entries made by the scribe today today, were at my direction and personally dictated to them by me, Dr. Molly Vasques on Jun 06, 2019. I have reviewed the chart and agree that the record accurately reflects my personal performance of the history, physical exam, assessment, and plan.

## 2019-07-10 ENCOUNTER — RX RENEWAL (OUTPATIENT)
Age: 49
End: 2019-07-10

## 2019-08-01 ENCOUNTER — TRANSCRIPTION ENCOUNTER (OUTPATIENT)
Age: 49
End: 2019-08-01

## 2019-08-13 ENCOUNTER — TRANSCRIPTION ENCOUNTER (OUTPATIENT)
Age: 49
End: 2019-08-13

## 2019-10-22 ENCOUNTER — RX RENEWAL (OUTPATIENT)
Age: 49
End: 2019-10-22

## 2019-12-06 NOTE — PROGRESS NOTE ADULT - PROBLEM/PLAN-1
DISPLAY PLAN FREE TEXT Include Z78.9 (Other Specified Conditions Influencing Health Status) As An Associated Diagnosis?: No Medical Necessity Information: It is in your best interest to select a reason for this procedure from the list below. All of these items fulfill various CMS LCD requirements except the new and changing color options. Consent: The patient's consent was obtained including but not limited to risks of crusting, scabbing, blistering, scarring, darker or lighter pigmentary change, recurrence, incomplete removal and infection. Post-Care Instructions: I reviewed with the patient in detail post-care instructions. Patient is to wear sunprotection, and avoid picking at any of the treated lesions. Pt may apply Vaseline to crusted or scabbing areas. Medical Necessity Clause: This procedure was medically necessary because the lesions that were treated were: Detail Level: Detailed

## 2019-12-18 ENCOUNTER — TRANSCRIPTION ENCOUNTER (OUTPATIENT)
Age: 49
End: 2019-12-18

## 2020-01-14 ENCOUNTER — RX RENEWAL (OUTPATIENT)
Age: 50
End: 2020-01-14

## 2020-01-23 ENCOUNTER — APPOINTMENT (OUTPATIENT)
Dept: FAMILY MEDICINE | Facility: CLINIC | Age: 50
End: 2020-01-23
Payer: COMMERCIAL

## 2020-01-23 ENCOUNTER — NON-APPOINTMENT (OUTPATIENT)
Age: 50
End: 2020-01-23

## 2020-01-23 VITALS — SYSTOLIC BLOOD PRESSURE: 100 MMHG | DIASTOLIC BLOOD PRESSURE: 70 MMHG

## 2020-01-23 VITALS
TEMPERATURE: 98.6 F | DIASTOLIC BLOOD PRESSURE: 60 MMHG | HEIGHT: 64 IN | OXYGEN SATURATION: 100 % | HEART RATE: 86 BPM | WEIGHT: 148 LBS | BODY MASS INDEX: 25.27 KG/M2 | SYSTOLIC BLOOD PRESSURE: 100 MMHG

## 2020-01-23 DIAGNOSIS — F41.9 ANXIETY DISORDER, UNSPECIFIED: ICD-10-CM

## 2020-01-23 PROCEDURE — 99214 OFFICE O/P EST MOD 30 MIN: CPT | Mod: 25

## 2020-01-23 PROCEDURE — 93000 ELECTROCARDIOGRAM COMPLETE: CPT

## 2020-01-23 RX ORDER — PREDNISONE 20 MG/1
20 TABLET ORAL
Qty: 6 | Refills: 0 | Status: DISCONTINUED | COMMUNITY
Start: 2018-12-28 | End: 2020-01-23

## 2020-01-23 RX ORDER — ONDANSETRON 8 MG/1
8 TABLET, ORALLY DISINTEGRATING ORAL
Qty: 8 | Refills: 0 | Status: DISCONTINUED | COMMUNITY
Start: 2019-04-04 | End: 2020-01-23

## 2020-01-23 RX ORDER — GABAPENTIN 300 MG/1
300 CAPSULE ORAL
Qty: 20 | Refills: 0 | Status: DISCONTINUED | COMMUNITY
Start: 2019-04-04 | End: 2020-01-23

## 2020-01-23 NOTE — ADDENDUM
[FreeTextEntry1] : I, Shirin Drummond acting as a scribe for Dr. Molly Vasques on Jan 23, 2020  at 1:33 PM\par

## 2020-01-23 NOTE — END OF VISIT
[FreeTextEntry3] : Medical record entries made by the scribe today today, were at my direction and personally dictated to them by me, Dr. Molly Vasques on Jan 23, 2020. I have reviewed the chart and agree that the record accurately reflects my personal performance of the history, physical exam, assessment, and plan.\par \par

## 2020-01-23 NOTE — PHYSICAL EXAM
[No Acute Distress] : no acute distress [Well Nourished] : well nourished [Well Developed] : well developed [Well-Appearing] : well-appearing [Normal Sclera/Conjunctiva] : normal sclera/conjunctiva [PERRL] : pupils equal round and reactive to light [EOMI] : extraocular movements intact [Normal Outer Ear/Nose] : the outer ears and nose were normal in appearance [Normal Oropharynx] : the oropharynx was normal [No JVD] : no jugular venous distention [No Lymphadenopathy] : no lymphadenopathy [Supple] : supple [Thyroid Normal, No Nodules] : the thyroid was normal and there were no nodules present [No Respiratory Distress] : no respiratory distress  [No Accessory Muscle Use] : no accessory muscle use [Clear to Auscultation] : lungs were clear to auscultation bilaterally [Normal Rate] : normal rate  [Regular Rhythm] : with a regular rhythm [No Murmur] : no murmur heard [Normal S1, S2] : normal S1 and S2 [No Carotid Bruits] : no carotid bruits [No Abdominal Bruit] : a ~M bruit was not heard ~T in the abdomen [No Varicosities] : no varicosities [Pedal Pulses Present] : the pedal pulses are present [No Palpable Aorta] : no palpable aorta [No Edema] : there was no peripheral edema [No Extremity Clubbing/Cyanosis] : no extremity clubbing/cyanosis [Soft] : abdomen soft [Non-distended] : non-distended [No Masses] : no abdominal mass palpated [Non Tender] : non-tender [No HSM] : no HSM [Normal Bowel Sounds] : normal bowel sounds [Normal Posterior Cervical Nodes] : no posterior cervical lymphadenopathy [Normal Anterior Cervical Nodes] : no anterior cervical lymphadenopathy [No CVA Tenderness] : no CVA  tenderness [No Spinal Tenderness] : no spinal tenderness [Grossly Normal Strength/Tone] : grossly normal strength/tone [No Joint Swelling] : no joint swelling [No Rash] : no rash [No Focal Deficits] : no focal deficits [Coordination Grossly Intact] : coordination grossly intact [Deep Tendon Reflexes (DTR)] : deep tendon reflexes were 2+ and symmetric [Normal Affect] : the affect was normal [Normal Gait] : normal gait [Normal Insight/Judgement] : insight and judgment were intact

## 2020-01-23 NOTE — PLAN
[FreeTextEntry1] : - Likely anxiety\par - Xanax ordered\par - Follow up with cardio\par - Encouraged to take Metoprolol 25 mg \par - EKG today \par - Encouraged to see therapist

## 2020-01-23 NOTE — HISTORY OF PRESENT ILLNESS
[FreeTextEntry8] : LYNN is a 49 year female here for c/o CP/dizziness for 3 days. Feels like she is one step from hospital. Constricting pain in neck/esophagus especially when going up/down stairs. Report she had stomach virus over weekend. Under significant amounts of stress. When she lays down she feels SOB. Fearful due to hx of MI. Has been taking Metoprolol 50 mg daily for the past 2 weeks.

## 2020-01-24 ENCOUNTER — APPOINTMENT (OUTPATIENT)
Dept: CARDIOLOGY | Facility: CLINIC | Age: 50
End: 2020-01-24
Payer: COMMERCIAL

## 2020-01-24 VITALS
HEART RATE: 82 BPM | SYSTOLIC BLOOD PRESSURE: 98 MMHG | WEIGHT: 148 LBS | BODY MASS INDEX: 25.27 KG/M2 | HEIGHT: 64 IN | OXYGEN SATURATION: 100 % | DIASTOLIC BLOOD PRESSURE: 70 MMHG

## 2020-01-24 DIAGNOSIS — I51.81 TAKOTSUBO SYNDROME: ICD-10-CM

## 2020-01-24 DIAGNOSIS — R07.9 CHEST PAIN, UNSPECIFIED: ICD-10-CM

## 2020-01-24 PROCEDURE — 99214 OFFICE O/P EST MOD 30 MIN: CPT | Mod: 25

## 2020-01-24 PROCEDURE — 93000 ELECTROCARDIOGRAM COMPLETE: CPT

## 2020-01-24 NOTE — PHYSICAL EXAM
[General Appearance - Well Developed] : well developed [Normal Appearance] : normal appearance [Well Groomed] : well groomed [No Deformities] : no deformities [General Appearance - In No Acute Distress] : no acute distress [General Appearance - Well Nourished] : well nourished [Normal Conjunctiva] : the conjunctiva exhibited no abnormalities [Eyelids - No Xanthelasma] : the eyelids demonstrated no xanthelasmas [Normal Oral Mucosa] : normal oral mucosa [No Oral Pallor] : no oral pallor [No Oral Cyanosis] : no oral cyanosis [Exaggerated Use Of Accessory Muscles For Inspiration] : no accessory muscle use [Auscultation Breath Sounds / Voice Sounds] : lungs were clear to auscultation bilaterally [Respiration, Rhythm And Depth] : normal respiratory rhythm and effort [Murmurs] : no murmurs present [Heart Sounds] : normal S1 and S2 [Heart Rate And Rhythm] : heart rate and rhythm were normal [Edema] : no peripheral edema present [Arterial Pulses Normal] : the arterial pulses were normal [Abdomen Soft] : soft [Abdomen Mass (___ Cm)] : no abdominal mass palpated [Abdomen Tenderness] : non-tender [Abnormal Walk] : normal gait [Gait - Sufficient For Exercise Testing] : the gait was sufficient for exercise testing [Cyanosis, Localized] : no localized cyanosis [Nail Clubbing] : no clubbing of the fingernails [Petechial Hemorrhages (___cm)] : no petechial hemorrhages [] : no rash [No Venous Stasis] : no venous stasis [Skin Color & Pigmentation] : normal skin color and pigmentation [Skin Lesions] : no skin lesions [No Skin Ulcers] : no skin ulcer [No Xanthoma] : no  xanthoma was observed [Affect] : the affect was normal [Impaired Insight] : insight and judgment were intact [Oriented To Time, Place, And Person] : oriented to person, place, and time [Mood] : the mood was normal [No Anxiety] : not feeling anxious

## 2020-01-29 ENCOUNTER — APPOINTMENT (OUTPATIENT)
Dept: FAMILY MEDICINE | Facility: CLINIC | Age: 50
End: 2020-01-29

## 2020-02-07 ENCOUNTER — APPOINTMENT (OUTPATIENT)
Dept: CARDIOLOGY | Facility: CLINIC | Age: 50
End: 2020-02-07

## 2020-02-10 PROBLEM — R07.9 CHEST PAIN: Status: ACTIVE | Noted: 2017-06-27

## 2020-02-10 PROBLEM — I51.81 TAKOTSUBO CARDIOMYOPATHY: Status: ACTIVE | Noted: 2017-07-25

## 2020-02-10 NOTE — DISCUSSION/SUMMARY
[FreeTextEntry1] : Pt is a 48 y/o F who presents today for atypical CP and SOB\par ECG NSR - no changes\par TTE 07/2018 08/2017 EF 65%, mild MR\par Stress echo 07/2018 showed normal augmentation, exercise time 10min 30 sec\par Will repeat TTE\par check labs\par Hold off on ischemic evaluation at this time until symptoms are better define\par Advised pt to go to the nearest ED if symptoms persist or worsen \par The described plan was discussed with the pt.  All questions and concerns were addressed to the best of my knowledge.  I encouraged her to call me with any problems which may arise\par

## 2020-02-10 NOTE — HISTORY OF PRESENT ILLNESS
[FreeTextEntry1] : Pt is a 50 y/o F who presents today for f/u.  She has PMH of Takotsubo's cardiomyopathy diagnosed 07/2017 at [facility], cardiac cath done by Dr. Montanez which showed normal coronary arteries but apical hypokinesis suspicious for stress induced CM.  TTE done post cath showed normal LV function.   \par For the past few days she has been getting chest discomfort along with SOB, not associated with exertion.  She denies diaphoresis, palpitations, dizziness, syncope, LE edema, PND. \par TTE 07/2018 08/2017 EF 65%, mild MR\par Stress echo 07/2018 showed normal augmentation, exercise time 10min 30 sec

## 2020-02-10 NOTE — REVIEW OF SYSTEMS
[Shortness Of Breath] : shortness of breath [see HPI] : see HPI [Chest Pain] : chest pain [Negative] : Heme/Lymph [Dyspnea on exertion] : not dyspnea during exertion [Lower Ext Edema] : no extremity edema [Chest  Pressure] : no chest pressure [Palpitations] : no palpitations

## 2020-02-26 ENCOUNTER — APPOINTMENT (OUTPATIENT)
Dept: FAMILY MEDICINE | Facility: CLINIC | Age: 50
End: 2020-02-26
Payer: COMMERCIAL

## 2020-02-26 VITALS
HEIGHT: 64 IN | BODY MASS INDEX: 24.75 KG/M2 | SYSTOLIC BLOOD PRESSURE: 108 MMHG | HEART RATE: 86 BPM | WEIGHT: 145 LBS | DIASTOLIC BLOOD PRESSURE: 70 MMHG | OXYGEN SATURATION: 98 %

## 2020-02-26 DIAGNOSIS — M54.9 DORSALGIA, UNSPECIFIED: ICD-10-CM

## 2020-02-26 PROCEDURE — G0442 ANNUAL ALCOHOL SCREEN 15 MIN: CPT

## 2020-02-26 PROCEDURE — 36415 COLL VENOUS BLD VENIPUNCTURE: CPT

## 2020-02-26 PROCEDURE — 99396 PREV VISIT EST AGE 40-64: CPT | Mod: 25

## 2020-02-26 RX ORDER — ALPRAZOLAM 0.25 MG/1
0.25 TABLET ORAL
Qty: 20 | Refills: 0 | Status: DISCONTINUED | COMMUNITY
Start: 2020-01-23 | End: 2020-02-26

## 2020-02-26 NOTE — ADDENDUM
[FreeTextEntry1] : I, Shirin Drummond acting as a scribe for Dr. Molly Vasques on Feb 26, 2020  at 9:18 AM\par 
DISPLAY PLAN FREE TEXT

## 2020-02-26 NOTE — PLAN
[FreeTextEntry1] : - Blood work script\par - Colonoscopy script\par - GI referral\par - Mammo script\par - Pain management referral\par - Begin taking Cyclobenzaprine 2.5 mg daily

## 2020-02-26 NOTE — HISTORY OF PRESENT ILLNESS
[FreeTextEntry1] : CPE\par -pt also c/o Lower RT sided back pain [de-identified] : LYNN is a 49 year female here for CPE. Mood is good. Currently taking ASA 81 mg, Metoprolol 50 mg, Cyclobenzaprine 5 mg daily. Reports she takes cyclobenzaprine for back pain. Has tried to wean off but suffered from withdrawal/pain. Followed up with Cardiologist  1/24/20 and has echo scheduled for 2/28/20. Followed up with behavioral specialist but has yet to make appt with specialist for further support. Reports improvement in stress level. Pt presents today c/o lower RT sided back pain for the past 6 weeks. States she twisted the wrong way and suddenly developed pain. Reports it is worsening in severity daily. States when she sits/stands for extended periods of time, she develops shooting pain/numbness down RT leg.

## 2020-02-26 NOTE — HEALTH RISK ASSESSMENT
[Patient reported mammogram was normal] : Patient reported mammogram was normal [Good] : ~his/her~  mood as  good [Patient reported PAP Smear was normal] : Patient reported PAP Smear was normal [None] : None [With Family] : lives with family [Employed] : employed [] :  [Feels Safe at Home] : Feels safe at home [Fully functional (bathing, dressing, toileting, transferring, walking, feeding)] : Fully functional (bathing, dressing, toileting, transferring, walking, feeding) [Fully functional (using the telephone, shopping, preparing meals, housekeeping, doing laundry, using] : Fully functional and needs no help or supervision to perform IADLs (using the telephone, shopping, preparing meals, housekeeping, doing laundry, using transportation, managing medications and managing finances) [MammogramDate] : 04/19 [PapSmearDate] : 02/20

## 2020-02-26 NOTE — PHYSICAL EXAM
[No Acute Distress] : no acute distress [Well Nourished] : well nourished [Well Developed] : well developed [Normal Sclera/Conjunctiva] : normal sclera/conjunctiva [Well-Appearing] : well-appearing [PERRL] : pupils equal round and reactive to light [EOMI] : extraocular movements intact [Normal Oropharynx] : the oropharynx was normal [Normal Outer Ear/Nose] : the outer ears and nose were normal in appearance [No JVD] : no jugular venous distention [Supple] : supple [Thyroid Normal, No Nodules] : the thyroid was normal and there were no nodules present [No Lymphadenopathy] : no lymphadenopathy [No Respiratory Distress] : no respiratory distress  [No Accessory Muscle Use] : no accessory muscle use [Clear to Auscultation] : lungs were clear to auscultation bilaterally [Normal Rate] : normal rate  [Regular Rhythm] : with a regular rhythm [Normal S1, S2] : normal S1 and S2 [No Murmur] : no murmur heard [No Carotid Bruits] : no carotid bruits [No Abdominal Bruit] : a ~M bruit was not heard ~T in the abdomen [Pedal Pulses Present] : the pedal pulses are present [No Varicosities] : no varicosities [No Edema] : there was no peripheral edema [No Palpable Aorta] : no palpable aorta [Soft] : abdomen soft [No Extremity Clubbing/Cyanosis] : no extremity clubbing/cyanosis [Non-distended] : non-distended [Non Tender] : non-tender [No HSM] : no HSM [No Masses] : no abdominal mass palpated [Normal Anterior Cervical Nodes] : no anterior cervical lymphadenopathy [Normal Bowel Sounds] : normal bowel sounds [Normal Posterior Cervical Nodes] : no posterior cervical lymphadenopathy [No CVA Tenderness] : no CVA  tenderness [No Joint Swelling] : no joint swelling [No Spinal Tenderness] : no spinal tenderness [Coordination Grossly Intact] : coordination grossly intact [Grossly Normal Strength/Tone] : grossly normal strength/tone [No Rash] : no rash [Normal Gait] : normal gait [No Focal Deficits] : no focal deficits [Normal Affect] : the affect was normal [Deep Tendon Reflexes (DTR)] : deep tendon reflexes were 2+ and symmetric [Normal Insight/Judgement] : insight and judgment were intact

## 2020-02-27 ENCOUNTER — APPOINTMENT (OUTPATIENT)
Dept: CARDIOLOGY | Facility: CLINIC | Age: 50
End: 2020-02-27

## 2020-03-05 ENCOUNTER — APPOINTMENT (OUTPATIENT)
Dept: CARDIOLOGY | Facility: CLINIC | Age: 50
End: 2020-03-05
Payer: COMMERCIAL

## 2020-03-05 PROCEDURE — 93306 TTE W/DOPPLER COMPLETE: CPT

## 2020-03-06 ENCOUNTER — APPOINTMENT (OUTPATIENT)
Dept: FAMILY MEDICINE | Facility: CLINIC | Age: 50
End: 2020-03-06

## 2020-05-04 ENCOUNTER — APPOINTMENT (OUTPATIENT)
Dept: FAMILY MEDICINE | Facility: CLINIC | Age: 50
End: 2020-05-04
Payer: COMMERCIAL

## 2020-05-04 VITALS
TEMPERATURE: 98.5 F | OXYGEN SATURATION: 98 % | DIASTOLIC BLOOD PRESSURE: 70 MMHG | WEIGHT: 150 LBS | HEART RATE: 80 BPM | HEIGHT: 64 IN | BODY MASS INDEX: 25.61 KG/M2 | SYSTOLIC BLOOD PRESSURE: 110 MMHG

## 2020-05-04 PROCEDURE — 99214 OFFICE O/P EST MOD 30 MIN: CPT

## 2020-05-04 NOTE — HISTORY OF PRESENT ILLNESS
[FreeTextEntry1] : follow up on MRI and FBW [de-identified] : patient here for follow up\par continuing to have low back and leg pain\par MRI shows S1 nerve root compression\par

## 2020-05-05 LAB
25(OH)D3 SERPL-MCNC: 22.4 NG/ML
ALBUMIN SERPL ELPH-MCNC: 4.7 G/DL
ALP BLD-CCNC: 49 U/L
ALT SERPL-CCNC: 10 U/L
ANION GAP SERPL CALC-SCNC: 13 MMOL/L
APPEARANCE: CLEAR
AST SERPL-CCNC: 16 U/L
BACTERIA: ABNORMAL
BASOPHILS # BLD AUTO: 0.04 K/UL
BASOPHILS NFR BLD AUTO: 0.7 %
BILIRUB SERPL-MCNC: 0.3 MG/DL
BILIRUBIN URINE: NEGATIVE
BLOOD URINE: NEGATIVE
BUN SERPL-MCNC: 11 MG/DL
CALCIUM SERPL-MCNC: 9.3 MG/DL
CHLORIDE SERPL-SCNC: 105 MMOL/L
CHOLEST SERPL-MCNC: 194 MG/DL
CHOLEST/HDLC SERPL: 4.3 RATIO
CO2 SERPL-SCNC: 24 MMOL/L
COLOR: NORMAL
CREAT SERPL-MCNC: 0.65 MG/DL
EOSINOPHIL # BLD AUTO: 0.1 K/UL
EOSINOPHIL NFR BLD AUTO: 1.7 %
ESTIMATED AVERAGE GLUCOSE: 105 MG/DL
GLUCOSE QUALITATIVE U: NEGATIVE
GLUCOSE SERPL-MCNC: 89 MG/DL
HBA1C MFR BLD HPLC: 5.3 %
HCT VFR BLD CALC: 43.1 %
HDLC SERPL-MCNC: 45 MG/DL
HGB BLD-MCNC: 13.7 G/DL
HYALINE CASTS: 3 /LPF
IMM GRANULOCYTES NFR BLD AUTO: 0.3 %
KETONES URINE: NEGATIVE
LDLC SERPL CALC-MCNC: 122 MG/DL
LEUKOCYTE ESTERASE URINE: NEGATIVE
LYMPHOCYTES # BLD AUTO: 2.45 K/UL
LYMPHOCYTES NFR BLD AUTO: 40.8 %
MAN DIFF?: NORMAL
MCHC RBC-ENTMCNC: 29 PG
MCHC RBC-ENTMCNC: 31.8 GM/DL
MCV RBC AUTO: 91.3 FL
MICROSCOPIC-UA: NORMAL
MONOCYTES # BLD AUTO: 0.32 K/UL
MONOCYTES NFR BLD AUTO: 5.3 %
NEUTROPHILS # BLD AUTO: 3.07 K/UL
NEUTROPHILS NFR BLD AUTO: 51.2 %
NITRITE URINE: NEGATIVE
PH URINE: 7
PLATELET # BLD AUTO: 255 K/UL
POTASSIUM SERPL-SCNC: 4.4 MMOL/L
PROT SERPL-MCNC: 6.6 G/DL
PROTEIN URINE: NEGATIVE
RBC # BLD: 4.72 M/UL
RBC # FLD: 13 %
RED BLOOD CELLS URINE: 6 /HPF
SODIUM SERPL-SCNC: 142 MMOL/L
SPECIFIC GRAVITY URINE: 1.01
SQUAMOUS EPITHELIAL CELLS: 3 /HPF
TRIGL SERPL-MCNC: 134 MG/DL
TSH SERPL-ACNC: 1.66 UIU/ML
URATE SERPL-MCNC: 4 MG/DL
UROBILINOGEN URINE: NORMAL
WBC # FLD AUTO: 6 K/UL
WHITE BLOOD CELLS URINE: 5 /HPF

## 2020-06-05 ENCOUNTER — APPOINTMENT (OUTPATIENT)
Dept: FAMILY MEDICINE | Facility: CLINIC | Age: 50
End: 2020-06-05
Payer: COMMERCIAL

## 2020-06-05 DIAGNOSIS — R82.90 UNSPECIFIED ABNORMAL FINDINGS IN URINE: ICD-10-CM

## 2020-06-05 PROCEDURE — 81003 URINALYSIS AUTO W/O SCOPE: CPT | Mod: QW

## 2020-06-05 PROCEDURE — 36415 COLL VENOUS BLD VENIPUNCTURE: CPT

## 2020-06-10 LAB
ANA PAT FLD IF-IMP: NORMAL
ANA SER IF-ACNC: ABNORMAL
APPEARANCE: CLEAR
BACTERIA: NEGATIVE
BASOPHILS # BLD AUTO: 0.05 K/UL
BASOPHILS NFR BLD AUTO: 0.6 %
BILIRUBIN URINE: NEGATIVE
BLOOD URINE: NEGATIVE
COLOR: NORMAL
CRP SERPL-MCNC: 0.17 MG/DL
EOSINOPHIL # BLD AUTO: 0.07 K/UL
EOSINOPHIL NFR BLD AUTO: 0.9 %
ERYTHROCYTE [SEDIMENTATION RATE] IN BLOOD BY WESTERGREN METHOD: 5 MM/HR
GLUCOSE QUALITATIVE U: NEGATIVE
HCT VFR BLD CALC: 43.4 %
HGB BLD-MCNC: 14.1 G/DL
HYALINE CASTS: 0 /LPF
IMM GRANULOCYTES NFR BLD AUTO: 0.3 %
KETONES URINE: ABNORMAL
LEUKOCYTE ESTERASE URINE: NEGATIVE
LYMPHOCYTES # BLD AUTO: 2.25 K/UL
LYMPHOCYTES NFR BLD AUTO: 28.4 %
MAN DIFF?: NORMAL
MCHC RBC-ENTMCNC: 28.8 PG
MCHC RBC-ENTMCNC: 32.5 GM/DL
MCV RBC AUTO: 88.8 FL
MICROSCOPIC-UA: NORMAL
MONOCYTES # BLD AUTO: 0.38 K/UL
MONOCYTES NFR BLD AUTO: 4.8 %
NEUTROPHILS # BLD AUTO: 5.16 K/UL
NEUTROPHILS NFR BLD AUTO: 65 %
NITRITE URINE: NEGATIVE
PH URINE: 6.5
PLATELET # BLD AUTO: 349 K/UL
PROTEIN URINE: NEGATIVE
RBC # BLD: 4.89 M/UL
RBC # FLD: 13.1 %
RED BLOOD CELLS URINE: 1 /HPF
RHEUMATOID FACT SER QL: 76 IU/ML
SPECIFIC GRAVITY URINE: 1.01
SQUAMOUS EPITHELIAL CELLS: 2 /HPF
UROBILINOGEN URINE: NORMAL
WBC # FLD AUTO: 7.93 K/UL
WHITE BLOOD CELLS URINE: 1 /HPF

## 2020-06-22 ENCOUNTER — RX RENEWAL (OUTPATIENT)
Age: 50
End: 2020-06-22

## 2020-06-23 ENCOUNTER — APPOINTMENT (OUTPATIENT)
Dept: RHEUMATOLOGY | Facility: CLINIC | Age: 50
End: 2020-06-23

## 2020-09-04 ENCOUNTER — APPOINTMENT (OUTPATIENT)
Dept: FAMILY MEDICINE | Facility: CLINIC | Age: 50
End: 2020-09-04
Payer: COMMERCIAL

## 2020-09-04 ENCOUNTER — NON-APPOINTMENT (OUTPATIENT)
Age: 50
End: 2020-09-04

## 2020-09-04 VITALS
HEART RATE: 75 BPM | BODY MASS INDEX: 25.44 KG/M2 | DIASTOLIC BLOOD PRESSURE: 78 MMHG | HEIGHT: 64 IN | WEIGHT: 149 LBS | SYSTOLIC BLOOD PRESSURE: 104 MMHG | OXYGEN SATURATION: 98 % | TEMPERATURE: 98.1 F

## 2020-09-04 DIAGNOSIS — R53.83 OTHER FATIGUE: ICD-10-CM

## 2020-09-04 PROCEDURE — 99213 OFFICE O/P EST LOW 20 MIN: CPT

## 2020-09-04 RX ORDER — METHYLPREDNISOLONE 4 MG/1
4 TABLET ORAL
Qty: 1 | Refills: 0 | Status: DISCONTINUED | COMMUNITY
Start: 2020-05-04 | End: 2020-09-04

## 2020-09-04 NOTE — HEALTH RISK ASSESSMENT
[Never (0 pts)] : Never (0 points) [1 or 2 (0 pts)] : 1 or 2 (0 points) [No] : In the past 12 months have you used drugs other than those required for medical reasons? No [0] : 2) Feeling down, depressed, or hopeless: Not at all (0) [Audit-CScore] : 0 [[id number]] : 0 [] : No

## 2020-09-04 NOTE — PLAN
[FreeTextEntry1] : Left shoulder pain: start naproxen 500 mg po bid prn for pain, start diclofenac gel apply bid to affected area x 7 days prn for pain, follow up left shoulder xray, patient has f/u appointment with rheumatology next month given elevated PEBBLES/RF\par Fatigue: bp wnl, f/u bloodwork

## 2020-09-04 NOTE — PHYSICAL EXAM
[Normal] : normal gait, coordination grossly intact, no focal deficits and deep tendon reflexes were 2+ and symmetric [de-identified] : left shoulder ROM limited 2/2 pain, no swelling, erythema, warmth or deformities, no loss of sensation, radial pulses 2+

## 2020-09-04 NOTE — HISTORY OF PRESENT ILLNESS
[Joint Pain, Localized In The Shoulder] : shoulder [___ Days ago] : [unfilled] days ago [Moderate] : moderate [Constant] : constant [Activity] : with activity [Improving] : improving [Rest] : rest [FreeTextEntry8] : pt is 51 yo female with pmhx of fibromyalgia, migraines, IBS, Takasubo CM (normal EF), +PEBBLES, +RF, and low Vit D presents with complaint of left shoulder pain. Pain is 6/10 in severity, sharp in character, began 3 days ago, suddenly, worse with movement, improved without movement. In addition, she reports feeling fatigued/lightheaded over the past 2 days. Denies fever, chills, cp, palpitations, sob, trauma, n/v, swelling, or calf pain.

## 2020-09-08 LAB
ALBUMIN SERPL ELPH-MCNC: 4.8 G/DL
ALP BLD-CCNC: 57 U/L
ALT SERPL-CCNC: 16 U/L
ANION GAP SERPL CALC-SCNC: 15 MMOL/L
AST SERPL-CCNC: 23 U/L
BASOPHILS # BLD AUTO: 0.05 K/UL
BASOPHILS NFR BLD AUTO: 0.8 %
BILIRUB SERPL-MCNC: 0.7 MG/DL
BUN SERPL-MCNC: 11 MG/DL
CALCIUM SERPL-MCNC: 9.6 MG/DL
CHLORIDE SERPL-SCNC: 104 MMOL/L
CO2 SERPL-SCNC: 23 MMOL/L
CREAT SERPL-MCNC: 0.68 MG/DL
EOSINOPHIL # BLD AUTO: 0.11 K/UL
EOSINOPHIL NFR BLD AUTO: 1.8 %
GLUCOSE SERPL-MCNC: 80 MG/DL
HCT VFR BLD CALC: 44.3 %
HGB BLD-MCNC: 14.4 G/DL
IMM GRANULOCYTES NFR BLD AUTO: 0.2 %
LYMPHOCYTES # BLD AUTO: 2.34 K/UL
LYMPHOCYTES NFR BLD AUTO: 37.3 %
MAN DIFF?: NORMAL
MCHC RBC-ENTMCNC: 29.6 PG
MCHC RBC-ENTMCNC: 32.5 GM/DL
MCV RBC AUTO: 91 FL
MONOCYTES # BLD AUTO: 0.28 K/UL
MONOCYTES NFR BLD AUTO: 4.5 %
NEUTROPHILS # BLD AUTO: 3.48 K/UL
NEUTROPHILS NFR BLD AUTO: 55.4 %
PLATELET # BLD AUTO: 292 K/UL
POTASSIUM SERPL-SCNC: 4.8 MMOL/L
PROT SERPL-MCNC: 7.1 G/DL
RBC # BLD: 4.87 M/UL
RBC # FLD: 12.9 %
SODIUM SERPL-SCNC: 141 MMOL/L
TSH SERPL-ACNC: 1.36 UIU/ML
WBC # FLD AUTO: 6.27 K/UL

## 2020-09-29 ENCOUNTER — APPOINTMENT (OUTPATIENT)
Dept: FAMILY MEDICINE | Facility: CLINIC | Age: 50
End: 2020-09-29

## 2020-09-30 ENCOUNTER — APPOINTMENT (OUTPATIENT)
Dept: FAMILY MEDICINE | Facility: CLINIC | Age: 50
End: 2020-09-30
Payer: COMMERCIAL

## 2020-09-30 VITALS
BODY MASS INDEX: 25.27 KG/M2 | SYSTOLIC BLOOD PRESSURE: 110 MMHG | DIASTOLIC BLOOD PRESSURE: 70 MMHG | HEIGHT: 64 IN | WEIGHT: 148 LBS | HEART RATE: 88 BPM | TEMPERATURE: 98.1 F | OXYGEN SATURATION: 98 %

## 2020-09-30 PROCEDURE — 99214 OFFICE O/P EST MOD 30 MIN: CPT

## 2020-09-30 NOTE — ADDENDUM
[FreeTextEntry1] : I, Sharlene Sultana acting as a scribe for Dr. Molly Vasques on Sep 30, 2020  at 11:28 AM\par

## 2020-09-30 NOTE — PLAN
[FreeTextEntry1] : Impingement Syndrome of AC Joint:\par - Education provided\par - Discussed conservative measures\par - Advised icing area for 20 min 3-4 x a day\par - Ibuprofen 600 mg 3x a day ordered \par - Avoid repetitive shoulder movement \par - Orthopedic or rheumatology advised for steroid injections if sx do not improve with 2 weeks of conservative measures\par - Has appt with rheumatology coming up\par - Discussed MRI option\par - X-ray of L spine ordered for lower back pain

## 2020-09-30 NOTE — END OF VISIT
[FreeTextEntry3] : Medical record entries made by the scribe today today, were at my direction and personally dictated to them by me, Dr. Molly Vasques on Sep 30, 2020. I have reviewed the chart and agree that the record accurately reflects my personal performance of the history, physical exam, assessment, and plan.\par

## 2020-09-30 NOTE — PHYSICAL EXAM
[No Acute Distress] : no acute distress [Well-Appearing] : well-appearing [Well Developed] : well developed [Well Nourished] : well nourished [Normal Sclera/Conjunctiva] : normal sclera/conjunctiva [PERRL] : pupils equal round and reactive to light [EOMI] : extraocular movements intact [No JVD] : no jugular venous distention [Normal Outer Ear/Nose] : the outer ears and nose were normal in appearance [Normal Oropharynx] : the oropharynx was normal [Supple] : supple [Thyroid Normal, No Nodules] : the thyroid was normal and there were no nodules present [No Lymphadenopathy] : no lymphadenopathy [No Accessory Muscle Use] : no accessory muscle use [No Respiratory Distress] : no respiratory distress  [Normal Rate] : normal rate  [Regular Rhythm] : with a regular rhythm [Clear to Auscultation] : lungs were clear to auscultation bilaterally [Normal S1, S2] : normal S1 and S2 [No Carotid Bruits] : no carotid bruits [No Murmur] : no murmur heard [Pedal Pulses Present] : the pedal pulses are present [No Abdominal Bruit] : a ~M bruit was not heard ~T in the abdomen [No Varicosities] : no varicosities [No Extremity Clubbing/Cyanosis] : no extremity clubbing/cyanosis [No Edema] : there was no peripheral edema [No Palpable Aorta] : no palpable aorta [Non Tender] : non-tender [Soft] : abdomen soft [Non-distended] : non-distended [No Masses] : no abdominal mass palpated [No HSM] : no HSM [Normal Bowel Sounds] : normal bowel sounds [Normal Posterior Cervical Nodes] : no posterior cervical lymphadenopathy [Normal Anterior Cervical Nodes] : no anterior cervical lymphadenopathy [No Spinal Tenderness] : no spinal tenderness [No CVA Tenderness] : no CVA  tenderness [No Joint Swelling] : no joint swelling [Grossly Normal Strength/Tone] : grossly normal strength/tone [Coordination Grossly Intact] : coordination grossly intact [No Rash] : no rash [No Focal Deficits] : no focal deficits [Normal Gait] : normal gait [Normal Affect] : the affect was normal [Normal Insight/Judgement] : insight and judgment were intact

## 2020-09-30 NOTE — HISTORY OF PRESENT ILLNESS
[FreeTextEntry1] : f/u x left shoulder pain [de-identified] : LYNN is a 50 year female here to follow up on left shoulder pain. No injuries or trauma. Contact pressure exacerbates pain. States at one point she had associated finger numbness. Limited ROM. X-ray of left shoulder was normal. Currently taking naproxen 500 mg with no relief. Was using diclofenac transdermal gel with no relief. Has f/u appt with rheumatology next month. \par Lower back pain persists as well. \par

## 2020-11-03 ENCOUNTER — APPOINTMENT (OUTPATIENT)
Dept: FAMILY MEDICINE | Facility: CLINIC | Age: 50
End: 2020-11-03

## 2020-11-16 ENCOUNTER — RX RENEWAL (OUTPATIENT)
Age: 50
End: 2020-11-16

## 2020-11-18 ENCOUNTER — APPOINTMENT (OUTPATIENT)
Dept: FAMILY MEDICINE | Facility: CLINIC | Age: 50
End: 2020-11-18
Payer: COMMERCIAL

## 2020-11-18 VITALS
DIASTOLIC BLOOD PRESSURE: 70 MMHG | HEIGHT: 64 IN | OXYGEN SATURATION: 98 % | TEMPERATURE: 98 F | WEIGHT: 151 LBS | SYSTOLIC BLOOD PRESSURE: 120 MMHG | BODY MASS INDEX: 25.78 KG/M2 | HEART RATE: 80 BPM

## 2020-11-18 DIAGNOSIS — F41.8 OTHER SPECIFIED ANXIETY DISORDERS: ICD-10-CM

## 2020-11-18 PROCEDURE — 99213 OFFICE O/P EST LOW 20 MIN: CPT

## 2020-11-18 RX ORDER — NAPROXEN 500 MG/1
500 TABLET ORAL
Qty: 42 | Refills: 0 | Status: DISCONTINUED | COMMUNITY
Start: 2020-09-04 | End: 2020-11-18

## 2020-11-18 RX ORDER — DICLOFENAC SODIUM 10 MG/G
1 GEL TOPICAL DAILY
Qty: 1 | Refills: 0 | Status: DISCONTINUED | COMMUNITY
Start: 2020-09-04 | End: 2020-11-18

## 2020-11-18 NOTE — ASSESSMENT
[FreeTextEntry1] : Left shoulder pain: persistent pain, f/u mri, refer to ortho\par Situational anxiety: start alprazolam 1 tab prior to MRI\par RTC 3 - 4 weeks

## 2020-11-18 NOTE — PHYSICAL EXAM
[Normal] : affect was normal and insight and judgment were intact [de-identified] : left shoulder exam limited 2/2 pain, ROM llimited 2/2 pain, no obvious deformities, sensation intact, unable to assess muscle strength

## 2020-11-18 NOTE — HISTORY OF PRESENT ILLNESS
[FreeTextEntry8] : 51 yo female with pmhx of left shoulder pain presents for follow up. Pt has complaint of persistent left shoulder pain. Pain is 8/10 in severity, worse with movement, nonradiating, sharp in character. MRI was ordered but patient did not obtain it as she says she is claustrophobic and walked out of exam. Denies fever, chills, cp, palpitations, sob, n/v, heat/cold intolerance, or calf pain.

## 2020-12-01 DIAGNOSIS — M19.90 UNSPECIFIED OSTEOARTHRITIS, UNSPECIFIED SITE: ICD-10-CM

## 2020-12-01 DIAGNOSIS — M77.9 ENTHESOPATHY, UNSPECIFIED: ICD-10-CM

## 2020-12-15 ENCOUNTER — APPOINTMENT (OUTPATIENT)
Dept: ORTHOPEDIC SURGERY | Facility: CLINIC | Age: 50
End: 2020-12-15
Payer: COMMERCIAL

## 2020-12-15 PROCEDURE — 73030 X-RAY EXAM OF SHOULDER: CPT | Mod: LT

## 2020-12-15 PROCEDURE — 99072 ADDL SUPL MATRL&STAF TM PHE: CPT

## 2020-12-15 PROCEDURE — 20610 DRAIN/INJ JOINT/BURSA W/O US: CPT | Mod: LT

## 2020-12-15 PROCEDURE — 99204 OFFICE O/P NEW MOD 45 MIN: CPT | Mod: 25

## 2020-12-15 NOTE — PROCEDURE
[de-identified] : Injection: Left shoulder (Subacromial).\par Indication: Pain/ Frozen Shoulder\par \par A discussion was had with the patient regarding this procedure and all questions were answered. All risks, benefits and alternatives were discussed. These included but were not limited to bleeding, infection, and allergic reaction. Alcohol was used to clean the skin, and betadine was used to sterilize and prep the area in the posterior aspect of the left shoulder. Ethyl chloride spray was then used as a topical anesthetic. A 22-gauge needle was used to inject 3cc 1% xylocaine, 2cc 0.25% bupivacaine and 1cc of 40mg/mL triamcinolone acetonide into the left subacromial space. A sterile bandage was then applied. The patient tolerated the procedure well and there were no complications.\par

## 2020-12-15 NOTE — DISCUSSION/SUMMARY
[de-identified] : LYNN is a 50 year old female who presents today for initial evaluation of left shoulder pain.  She denies injury or trauma.  Patient's pain began approx. 3 months ago.  She saw her PCP who obtained an MRI and radiographs.  She denies conservative treatment besides rest at this point.  She sees a rheumatologist for her RA, colitis and IBS.  Patient has been prescribed methotrexate but has not taken it yet.  Patient's pain begins in her supraspinatus fossa and radiates down to her elbow.  She admits to prior ulnar neuropathy that has since resolved.\par \par Based off of this and her clinical exam I believe her primary complaint to be adhesive capsulitis of which conservative measures are indicated. We discussed the 3 stages of adhesive capsulitis. Stage I is the inflammatory stage where she is in pain. States she was a frozen stage where she has limited range of motion but minimal pain. Stage III is the thawing stage where her range of motion starts to increase.\par \par Pt due to her acute pain and stiffness elected for a corticosteroid injection at today's visit and tolerated the procedure well. She should take it easy for the next 2-3 days while icing the joint and they may start PT in 1 week from today, 2x/week x 6-8 weeks. At that time we will see her for clinical reassessment. Pt agrees to the above plan and all questions were answered.\par \par

## 2020-12-15 NOTE — PHYSICAL EXAM
[de-identified] : Physical Exam:\par General: Well appearing, no acute distress\par Neurologic: A&Ox3, No focal deficits\par Head: NCAT without abrasions, lacerations, or ecchymosis to head, face, or scalp\par Eyes: No scleral icterus, no gross abnormalities\par Respiratory: Equal chest wall expansion bilaterally, no accessory muscle use\par Lymphatic: No lymphadenopathy palpated\par Skin: Warm and dry\par Psychiatric: Normal mood and affect\par \par C-Spine\par Palpation: Tenderness to paraspinal muscles and trapezius muscle\par ROM: side bending, symmetrical. Pain with extension and flexion of the neck.\par Reflexes: C5-7 [normal]\par \par Left Shoulder\par ·	Inspection/Palpation: no tenderness, swelling or deformities\par ·	Range of Motion: no crepitus with ROM; Active FF 0-90; ER at side 0-10; IR to side ; Passive FF 0-90; ER at side 0-15; IR to side\par -              Arc of Motion: ER to 10 degrees, IR to 5 degrees\par ·	Strength: forward elevation in scapular plane [4/5], internal rotation [4/5], external rotation [4/5], adduction [4/5] and abduction [4/5]\par ·	Stability: no joint instability on provocative testing\par ·	Tests: unable due to lack of ROM\par \par Right Shoulder\par ·	Inspection/Palpation: no tenderness, swelling or deformities\par ·	Range of Motion: full and painless in all planes, no crepitus\par ·	Strength: forward elevation in scapular plane 5/5, internal rotation 5/5, external rotation 5/5, adduction 5/5 and abduction 5/5\par ·	Stability: no joint instability on provocative testing\par ·	Tests: Barrow test negative, Neer sign negative, negative drop arm test secondary to pain, bear hug test negative, Napolean sign negative, cross arm adduction negative, lift off sign positive, hornblowers sign negative, speeds test negative, Yergason's test negative, no bicipital groove tenderness, Edwards's Active Compression test negative [de-identified] : 2 views left shoulder reviewed from an outside facility were free of fracture, dislocation or deformity.\par \par MRI to left shoulder reveals: biceps tendinopathy, ac joint hypertrophy with RTC tendinopathy and GH joint effusion performed on 11/24/20202 at [facility]

## 2020-12-15 NOTE — HISTORY OF PRESENT ILLNESS
[Stable] : stable [de-identified] : LYNN is a 50 year old female who presents today for initial evaluation of left shoulder pain.  She denies injury or trauma.  Patient's pain began approx. 3 months ago.  She saw her PCP who obtained an MRI and radiographs.  She denies conservative treatment besides rest at this point.  She sees a rheumatologist for her RA, colitis and IBS.  Patient has been prescribed methotrexate but has not taken it yet.  Patient's pain begins in her supraspinatus fossa and radiates down to her elbow.  She admits to prior ulnar neuropathy that has since resolved.\par

## 2020-12-17 ENCOUNTER — RX RENEWAL (OUTPATIENT)
Age: 50
End: 2020-12-17

## 2021-01-07 ENCOUNTER — RX RENEWAL (OUTPATIENT)
Age: 51
End: 2021-01-07

## 2021-01-19 ENCOUNTER — APPOINTMENT (OUTPATIENT)
Dept: ORTHOPEDIC SURGERY | Facility: CLINIC | Age: 51
End: 2021-01-19
Payer: MEDICAID

## 2021-01-19 PROCEDURE — 99214 OFFICE O/P EST MOD 30 MIN: CPT

## 2021-01-19 PROCEDURE — 99072 ADDL SUPL MATRL&STAF TM PHE: CPT

## 2021-01-19 RX ORDER — METHYLPREDNISOLONE 4 MG/1
4 TABLET ORAL
Qty: 1 | Refills: 0 | Status: COMPLETED | COMMUNITY
Start: 2021-01-19 | End: 2021-01-25

## 2021-01-19 NOTE — DISCUSSION/SUMMARY
[de-identified] : LYNN is a 50 year old female who presents today for follow up evaluation of left shoulder pain secondary to adhesive capsulitis.  She denies injury or trauma.  Patient's pain began approx. 4 months ago.  She saw her PCP who obtained an MRI and radiographs.  She sees a rheumatologist for her RA, colitis and IBS.  Patient has been prescribed methotrexate but has not taken it yet.  She admits to prior ulnar neuropathy that has since resolved.\par \par Patient received a cortisone injection to subacromial space 4 weeks ago which did resolve her pain.  She did not begin PT until 1 week ago and state her pain has returned at the level prior to her injection.  \par \par We had a thorough discussion regarding the nature of her pain, the pathophysiology, as well as all treatment options. We discussed the 3 stages of adhesive capsulitis. Stage I is the inflammatory stage where she is in pain. States she was a frozen stage where she has limited range of motion but minimal pain. Stage III is the thawing stage where her range of motion starts to increase.\par \par At this time it is too soon for another cortisone injection and the patient requires more time with physical therapy. She will continue PT 2x/week x 6-8 weeks. She will continue daily HEP. We prescribed her medrol to take as directed at this time as well.  At that time we will see her for clinical reassessment. Pt agrees to the above plan and all questions were answered.\par \par

## 2021-01-19 NOTE — HISTORY OF PRESENT ILLNESS
[Worsening] : worsening [___ mths] : [unfilled] month(s) ago [7] : a minimum pain level of 7/10 [8] : a maximum pain level of 8/10 [Lifting] : worsened by lifting [Physical Therapy] : relieved by physical therapy [de-identified] : LYNN is a 50 year old female who presents today for follow yp evaluation of left shoulder pain.  She denies injury or trauma.  Patient's pain began approx. 4 months ago.  She saw her PCP who obtained an MRI and radiographs.  She sees a rheumatologist for her RA, colitis and IBS.  Patient has been prescribed methotrexate but has not taken it yet.  She admits to prior ulnar neuropathy that has since resolved.\par \par Patient received a cortisone injection to subacromial space 4 weeks ago which did resolve her pain.  She did not begin PT until 1 week ago and state her pain has returned at the level prior to her injection.  \par  [de-identified] : cortisone injection

## 2021-01-19 NOTE — PHYSICAL EXAM
[de-identified] : Physical Exam:\par General: Well appearing, no acute distress\par Neurologic: A&Ox3, No focal deficits\par Head: NCAT without abrasions, lacerations, or ecchymosis to head, face, or scalp\par Eyes: No scleral icterus, no gross abnormalities\par Respiratory: Equal chest wall expansion bilaterally, no accessory muscle use\par Lymphatic: No lymphadenopathy palpated\par Skin: Warm and dry\par Psychiatric: Normal mood and affect\par \par C-Spine\par Palpation: Tenderness to paraspinal muscles and trapezius muscle\par ROM: side bending, symmetrical. Pain with extension and flexion of the neck.\par Reflexes: C5-7 [normal]\par \par Left Shoulder\par ·	Inspection/Palpation: no tenderness, swelling or deformities\par ·	Range of Motion: no crepitus with ROM; Active FF 0-90; ER at side 0-10; IR to side ; Passive FF 0-90; ER at side 0-15; IR to side\par -              Arc of Motion: ER to 10 degrees, IR to 5 degrees\par ·	Strength: forward elevation in scapular plane [4/5], internal rotation [4/5], external rotation [4/5], adduction [4/5] and abduction [4/5]\par ·	Stability: no joint instability on provocative testing\par ·	Tests: unable due to lack of ROM\par \par Right Shoulder\par ·	Inspection/Palpation: no tenderness, swelling or deformities\par ·	Range of Motion: full and painless in all planes, no crepitus\par ·	Strength: forward elevation in scapular plane 5/5, internal rotation 5/5, external rotation 5/5, adduction 5/5 and abduction 5/5\par ·	Stability: no joint instability on provocative testing\par ·	Tests: Barrow test negative, Neer sign negative, negative drop arm test secondary to pain, bear hug test negative, Napolean sign negative, cross arm adduction negative, lift off sign positive, hornblowers sign negative, speeds test negative, Yergason's test negative, no bicipital groove tenderness, Edwards's Active Compression test negative [de-identified] : 2 views left shoulder reviewed from an outside facility were free of fracture, dislocation or deformity.\par \par MRI to left shoulder reveals: biceps tendinopathy, ac joint hypertrophy with RTC tendinopathy and GH joint effusion performed on 11/24/20202 at [facility]

## 2021-02-08 ENCOUNTER — APPOINTMENT (OUTPATIENT)
Dept: FAMILY MEDICINE | Facility: CLINIC | Age: 51
End: 2021-02-08
Payer: MEDICAID

## 2021-02-08 VITALS
OXYGEN SATURATION: 98 % | HEART RATE: 85 BPM | HEIGHT: 64 IN | DIASTOLIC BLOOD PRESSURE: 78 MMHG | SYSTOLIC BLOOD PRESSURE: 100 MMHG | WEIGHT: 151 LBS | BODY MASS INDEX: 25.78 KG/M2

## 2021-02-08 DIAGNOSIS — M54.5 LOW BACK PAIN: ICD-10-CM

## 2021-02-08 DIAGNOSIS — N93.9 ABNORMAL UTERINE AND VAGINAL BLEEDING, UNSPECIFIED: ICD-10-CM

## 2021-02-08 DIAGNOSIS — M25.559 PAIN IN UNSPECIFIED HIP: ICD-10-CM

## 2021-02-08 PROCEDURE — 99072 ADDL SUPL MATRL&STAF TM PHE: CPT

## 2021-02-08 PROCEDURE — 99214 OFFICE O/P EST MOD 30 MIN: CPT

## 2021-02-08 NOTE — PLAN
[FreeTextEntry1] : \par - Recommended Tylenol for pain \par - Abdomen/pelvic US ordered \par - Limit stimuli \par - Advised neuro assessment daily \par - F/u with orthopedic Dr. Mckeon for left shoulder/hip/knee pain \par - PT referral \par - She is to notify office if sx persist/worsen\par

## 2021-02-08 NOTE — REVIEW OF SYSTEMS
[Nausea] : nausea [Headache] : headache [Negative] : Heme/Lymph [FreeTextEntry8] : abnormal vaginal bleeding  [FreeTextEntry9] : left shoulder/left knee pain  [de-identified] : HPI

## 2021-02-08 NOTE — ADDENDUM
[FreeTextEntry1] : I, Sharlene Sultana acting as a scribe for Dr. Molly Vasques on Feb 08, 2021  at 11:38 AM\par

## 2021-02-08 NOTE — END OF VISIT
[FreeTextEntry3] : Medical record entries made by the scribe today today, were at my direction and personally dictated to them by me, Dr. Molly Vasques on Feb 08, 2021. I have reviewed the chart and agree that the record accurately reflects my personal performance of the history, physical exam, assessment, and plan.\par

## 2021-02-08 NOTE — HISTORY OF PRESENT ILLNESS
[FreeTextEntry8] : Pt here s/p fall on 02/06/2021. States she slipped on ice and fell landing on her left side, hitting her head. No LOC. Reports she is having pain on head/ left hip/shoulder/knee. Associated nausea with sensitivity to light. No memory impairments. Pt was seen at [facility] and discharged the same day. Had negative CT scan of head. States she feels like she has a concussion. Reports vaginal bleeding yesterday. LMP was 2-3 months ago. \par

## 2021-02-09 ENCOUNTER — APPOINTMENT (OUTPATIENT)
Dept: ORTHOPEDIC SURGERY | Facility: CLINIC | Age: 51
End: 2021-02-09
Payer: MEDICAID

## 2021-02-09 PROCEDURE — 73030 X-RAY EXAM OF SHOULDER: CPT | Mod: LT

## 2021-02-09 PROCEDURE — 99214 OFFICE O/P EST MOD 30 MIN: CPT

## 2021-02-09 PROCEDURE — 99072 ADDL SUPL MATRL&STAF TM PHE: CPT

## 2021-02-09 NOTE — HISTORY OF PRESENT ILLNESS
[Worsening] : worsening [___ mths] : [unfilled] month(s) ago [7] : a minimum pain level of 7/10 [8] : a maximum pain level of 8/10 [Lifting] : worsened by lifting [Physical Therapy] : relieved by physical therapy [de-identified] : LYNN is a 50 year old female who presents today for follow up evaluation of left shoulder pain. Patient's pain began approx. 4 months ago.  She saw her PCP who obtained an MRI and radiographs.  She sees a rheumatologist for her RA, colitis and IBS.  Patient has been prescribed methotrexate but has not taken it yet.  She admits to prior ulnar neuropathy that has since resolved. Patient received a cortisone injection to subacromial space 7 weeks ago which did resolve her pain. \par \par Currently, she presents post fall on ice from 02/06/2021. Patient fell directly on L shoulder. She went to [facility] in [place]. she admits to taking tylenol for the pain. She endorses numbness and tingling to the extremity. She reports prior to fall, her shoulder pain was improving from cortisone injection and PT. \par  [de-identified] : cortisone injection

## 2021-02-09 NOTE — PHYSICAL EXAM
[de-identified] : Physical Exam:\par General: Well appearing, no acute distress\par Neurologic: A&Ox3, No focal deficits\par Head: NCAT without abrasions, lacerations, or ecchymosis to head, face, or scalp\par Eyes: No scleral icterus, no gross abnormalities\par Respiratory: Equal chest wall expansion bilaterally, no accessory muscle use\par Lymphatic: No lymphadenopathy palpated\par Skin: Warm and dry\par Psychiatric: Normal mood and affect\par \par C-Spine\par Palpation: Tenderness to paraspinal muscles and trapezius muscle\par ROM: side bending, symmetrical. Pain with extension and flexion of the neck.\par Reflexes: C5-7 [normal]\par \par Left Shoulder\par ·	Inspection/Palpation: no tenderness, swelling or deformities; tenderness over the AC joint\par ·	Range of Motion: no crepitus with ROM; Active FF 0-90; ER at side 0-10; IR to side ; Passive FF 0-90; ER at side 0-15; IR to side\par -              Arc of Motion: ER to 10 degrees, IR to 5 degrees\par ·	Strength: forward elevation in scapular plane [4/5], internal rotation [4/5], external rotation [4/5], adduction [4/5] and abduction [4/5]\par ·	Stability: no joint instability on provocative testing\par ·	Tests: unable due to lack of ROM\par \par Right Shoulder\par ·	Inspection/Palpation: no tenderness, swelling or deformities\par ·	Range of Motion: full and painless in all planes, no crepitus\par ·	Strength: forward elevation in scapular plane 5/5, internal rotation 5/5, external rotation 5/5, adduction 5/5 and abduction 5/5\par ·	Stability: no joint instability on provocative testing\par ·	Tests: Barrow test negative, Neer sign negative, negative drop arm test secondary to pain, bear hug test negative, Napolean sign negative, cross arm adduction negative, lift off sign positive, hornblowers sign negative, speeds test negative, Yergason's test negative, no bicipital groove tenderness, Edwards's Active Compression test negative [de-identified] : 4 views of L shoulder obtained today reveal:\par \par MRI to left shoulder reveals: biceps tendinopathy, ac joint hypertrophy with RTC tendinopathy and GH joint effusion performed on 11/24/20202 at [facility]

## 2021-02-09 NOTE — DISCUSSION/SUMMARY
[de-identified] : Toshia is a 50-year-old female with chronic adhesive capsulitis which was improving until this weekend.  It appears now she has a grade 1 AC separation after a fall with direct trauma to her left shoulder.  I had a thorough discussion with her regarding regarding the anatomy and pathophysiology surrounding her injury.  We discussed treatment plans.  At this time given her acute inflamed inflammation of the shoulder as well as the rest of her body I recommend a Medrol Dosepak followed by meloxicam.  She may return to physical therapy in 1 week.  She does have postconcussive symptoms as well in which she saw Dr. Vasques for.  We discussed the possibility of worsening symptoms and what those might be.  She will be seeing Dr. Vasques in 2 weeks.  She may follow-up with me in 3 weeks for shoulder reassessment.  All questions were answered she agrees with above plan.

## 2021-02-13 ENCOUNTER — APPOINTMENT (OUTPATIENT)
Dept: ULTRASOUND IMAGING | Facility: CLINIC | Age: 51
End: 2021-02-13
Payer: MEDICAID

## 2021-02-13 ENCOUNTER — OUTPATIENT (OUTPATIENT)
Dept: OUTPATIENT SERVICES | Facility: HOSPITAL | Age: 51
LOS: 1 days | End: 2021-02-13
Payer: MEDICAID

## 2021-02-13 DIAGNOSIS — R10.9 UNSPECIFIED ABDOMINAL PAIN: ICD-10-CM

## 2021-02-13 DIAGNOSIS — Z98.890 OTHER SPECIFIED POSTPROCEDURAL STATES: Chronic | ICD-10-CM

## 2021-02-13 DIAGNOSIS — N93.9 ABNORMAL UTERINE AND VAGINAL BLEEDING, UNSPECIFIED: ICD-10-CM

## 2021-02-13 PROCEDURE — 76830 TRANSVAGINAL US NON-OB: CPT | Mod: 26

## 2021-02-13 PROCEDURE — 76700 US EXAM ABDOM COMPLETE: CPT | Mod: 26

## 2021-02-13 PROCEDURE — 76830 TRANSVAGINAL US NON-OB: CPT

## 2021-02-13 PROCEDURE — 76700 US EXAM ABDOM COMPLETE: CPT

## 2021-02-22 ENCOUNTER — NON-APPOINTMENT (OUTPATIENT)
Age: 51
End: 2021-02-22

## 2021-02-23 ENCOUNTER — APPOINTMENT (OUTPATIENT)
Dept: FAMILY MEDICINE | Facility: CLINIC | Age: 51
End: 2021-02-23

## 2021-03-02 ENCOUNTER — APPOINTMENT (OUTPATIENT)
Dept: FAMILY MEDICINE | Facility: CLINIC | Age: 51
End: 2021-03-02
Payer: MEDICAID

## 2021-03-02 ENCOUNTER — APPOINTMENT (OUTPATIENT)
Dept: ORTHOPEDIC SURGERY | Facility: CLINIC | Age: 51
End: 2021-03-02
Payer: MEDICAID

## 2021-03-02 VITALS
DIASTOLIC BLOOD PRESSURE: 68 MMHG | OXYGEN SATURATION: 98 % | SYSTOLIC BLOOD PRESSURE: 112 MMHG | BODY MASS INDEX: 25.44 KG/M2 | WEIGHT: 149 LBS | HEIGHT: 64 IN | TEMPERATURE: 97.8 F | HEART RATE: 85 BPM

## 2021-03-02 DIAGNOSIS — M54.32 SCIATICA, LEFT SIDE: ICD-10-CM

## 2021-03-02 DIAGNOSIS — S06.0X0A CONCUSSION W/OUT LOSS OF CONSCIOUSNESS, INITIAL ENCOUNTER: ICD-10-CM

## 2021-03-02 PROCEDURE — 99072 ADDL SUPL MATRL&STAF TM PHE: CPT

## 2021-03-02 PROCEDURE — 99214 OFFICE O/P EST MOD 30 MIN: CPT | Mod: 25

## 2021-03-02 PROCEDURE — 99214 OFFICE O/P EST MOD 30 MIN: CPT

## 2021-03-02 PROCEDURE — 20610 DRAIN/INJ JOINT/BURSA W/O US: CPT | Mod: LT

## 2021-03-02 RX ORDER — IBUPROFEN 600 MG/1
600 TABLET, FILM COATED ORAL
Qty: 60 | Refills: 1 | Status: DISCONTINUED | COMMUNITY
Start: 2020-09-30 | End: 2021-03-02

## 2021-03-02 NOTE — PHYSICAL EXAM
[de-identified] : Physical Exam:\par General: Well appearing, no acute distress\par Neurologic: A&Ox3, No focal deficits\par Head: NCAT without abrasions, lacerations, or ecchymosis to head, face, or scalp\par Eyes: No scleral icterus, no gross abnormalities\par Respiratory: Equal chest wall expansion bilaterally, no accessory muscle use\par Lymphatic: No lymphadenopathy palpated\par Skin: Warm and dry\par Psychiatric: Normal mood and affect\par \par C-Spine\par Palpation: Tenderness to paraspinal muscles and trapezius muscle\par ROM: side bending, symmetrical. Pain with extension and flexion of the neck.\par Reflexes: C5-7 [normal]\par \par Left Shoulder\par ·	Inspection/Palpation: no tenderness, swelling or deformities; tenderness over the AC joint\par ·	Range of Motion: no crepitus with ROM; Active FF 0-90; ER at side 0-10; IR to side ; Passive FF 0-90; ER at side 0-15; IR to side\par -              Arc of Motion: ER to 10 degrees, IR to 5 degrees\par ·	Strength: forward elevation in scapular plane [4/5], internal rotation [4/5], external rotation [4/5], adduction [4/5] and abduction [4/5]\par ·	Stability: no joint instability on provocative testing\par ·	Tests: unable due to lack of ROM\par \par Right Shoulder\par ·	Inspection/Palpation: no tenderness, swelling or deformities\par ·	Range of Motion: full and painless in all planes, no crepitus\par ·	Strength: forward elevation in scapular plane 5/5, internal rotation 5/5, external rotation 5/5, adduction 5/5 and abduction 5/5\par ·	Stability: no joint instability on provocative testing\par ·	Tests: Barrow test negative, Neer sign negative, negative drop arm test secondary to pain, bear hug test negative, Napolean sign negative, cross arm adduction negative, lift off sign positive, hornblowers sign negative, speeds test negative, Yergason's test negative, no bicipital groove tenderness, Edwards's Active Compression test negative [de-identified] : See Scanned in report

## 2021-03-02 NOTE — ASSESSMENT
[FreeTextEntry1] : X-ray and MRI reviewed with patient. \par \par has follow up with ortho and consult with concussion specialist pending\par \par xray of lumbar spine shows degenerativechanges patient complaining of lower extremity neuropathy\par consider MRI of LS spine\par she will discuss with physiatrist\par
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
## 2021-03-02 NOTE — END OF VISIT
[FreeTextEntry3] : Medical record entries made by the scribe today today, were at my direction and personally dictated to them by me, Dr. Molly Vasques on Mar 02, 2021. I have reviewed the chart and agree that the record accurately reflects my personal performance of the history, physical exam, assessment, and plan.\par

## 2021-03-02 NOTE — ADDENDUM
[FreeTextEntry1] : I, Sharlene Sultana acting as a scribe for Dr. Molly Vasques on Mar 02, 2021  at 8:48 AM\par

## 2021-03-02 NOTE — PROCEDURE
[de-identified] : Injection: Left shoulder (Subacromial).\par Indication: Pain\par \par A discussion was had with the patient regarding this procedure and all questions were answered. All risks, benefits and alternatives were discussed. These included but were not limited to bleeding, infection, and allergic reaction. Alcohol was used to clean the skin, and betadine was used to sterilize and prep the area in the posterior aspect of the left shoulder. Ethyl chloride spray was then used as a topical anesthetic. A 22-gauge needle was used to inject 3cc 1% xylocaine, 2cc 0.25% bupivacaine and 1cc of 40mg/mL triamcinolone acetonide into the left subacromial space. A sterile bandage was then applied. The patient tolerated the procedure well and there were no complications.\par

## 2021-03-02 NOTE — PLAN
[FreeTextEntry1] : \par - F/u with orthopedic. She has appt today. \par - Needs ortho referral again due to insurance \par - Physiatry referral

## 2021-03-02 NOTE — DISCUSSION/SUMMARY
[de-identified] : LYNN is a 50 year old female who presents today for follow up evaluation of left shoulder pain.  She saw her PCP who obtained an MRI and radiographs.  She sees a rheumatologist for her RA, colitis and IBS.  Patient has been prescribed methotrexate but has not taken it yet.  She admits to prior ulnar neuropathy that has since resolved. Patient received a cortisone injection to subacromial space in [facility] by us that helped her pain and stiffness alongside PT but then had a subsequent injury in Feb.\par \par Currently, she presents post fall on ice from 02/06/2021. Patient fell directly on L shoulder. She went to [facility] in [place]. she admits to taking tylenol for the pain. She endorses numbness and tingling to the extremity. She reports prior to fall, her shoulder pain was improving from cortisone injection and PT. She comes in today for review of a new MRI performed due to this injury. No full thickness RTC tearing noted. She was prescribed mobic and medrol at her last visit with a new PT script given. At this time, she has no improvements in her symptoms.\par \par We had a thorough discussion regarding the nature of her pain, the pathophysiology, as well as all treatment options. Based on the results of her new MRI findings, and due to her significant improvements in the past with a steroid injection, she elects for a second today. She tolerated this well. She will ice her shoulder over the next week and start PT 2-3x/week at that time. She will perform this for 6-8 weeks and thereafter daily HEP. If she is doing well at that time she will follow up with us in the future on an prn basis. She agrees with the above plan and all questions were answered.\par

## 2021-03-02 NOTE — HEALTH RISK ASSESSMENT
[Patient reported mammogram was normal] : Patient reported mammogram was normal [MammogramDate] : 04/19

## 2021-03-03 ENCOUNTER — APPOINTMENT (OUTPATIENT)
Dept: PHYSICAL MEDICINE AND REHAB | Facility: CLINIC | Age: 51
End: 2021-03-03
Payer: MEDICAID

## 2021-03-03 VITALS
TEMPERATURE: 97.2 F | DIASTOLIC BLOOD PRESSURE: 97 MMHG | BODY MASS INDEX: 25.44 KG/M2 | WEIGHT: 149 LBS | HEIGHT: 64 IN | HEART RATE: 74 BPM | SYSTOLIC BLOOD PRESSURE: 135 MMHG

## 2021-03-03 DIAGNOSIS — R42 DIZZINESS AND GIDDINESS: ICD-10-CM

## 2021-03-03 DIAGNOSIS — G44.319 ACUTE POST-TRAUMATIC HEADACHE, NOT INTRACTABLE: ICD-10-CM

## 2021-03-03 DIAGNOSIS — F40.298 OTHER SPECIFIED PHOBIA: ICD-10-CM

## 2021-03-03 DIAGNOSIS — H53.149 VISUAL DISCOMFORT, UNSPECIFIED: ICD-10-CM

## 2021-03-03 DIAGNOSIS — M54.2 CERVICALGIA: ICD-10-CM

## 2021-03-03 DIAGNOSIS — G47.9 SLEEP DISORDER, UNSPECIFIED: ICD-10-CM

## 2021-03-03 PROCEDURE — 99072 ADDL SUPL MATRL&STAF TM PHE: CPT

## 2021-03-03 PROCEDURE — 99205 OFFICE O/P NEW HI 60 MIN: CPT

## 2021-03-03 RX ORDER — METHOTREXATE 2.5 MG/1
2.5 TABLET ORAL
Qty: 24 | Refills: 0 | Status: ACTIVE | COMMUNITY
Start: 2021-02-02
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
## 2021-03-03 NOTE — PHYSICAL EXAM
[Normal] : Oriented to person, place, and time, insight and judgement were intact and the affect was normal [de-identified] : no saccades, no nystagmus, no symptoms with testing, balance intact with static, dynamic and head turns  [de-identified] : pain to palpation to neck with limited AROM [de-identified] : Slowed gait and transfers [de-identified] : No focal deficits, Language intact, No sensory deficits, Coordination intact

## 2021-03-03 NOTE — REVIEW OF SYSTEMS
[Patient Intake Form Reviewed] : Patient intake form was reviewed [Vision Problems] : vision problems [Nausea] : nausea [Joint Pain] : joint pain [Joint Stiffness] : joint stiffness [Muscle Pain] : muscle pain [Headache] : headache [Insomnia] : insomnia [Anxiety] : anxiety [Depression] : depression [Negative] : Heme/Lymph

## 2021-03-04 DIAGNOSIS — M54.5 LOW BACK PAIN: ICD-10-CM

## 2021-03-04 DIAGNOSIS — M54.16 RADICULOPATHY, LUMBAR REGION: ICD-10-CM

## 2021-03-09 NOTE — DISCHARGE NOTE ADULT - LOW SALT DIET. ACTIVITY AS TOLERATED. CALL YOUR DOCTOR FOR FOLLOW UP APPOINTMENT
Urology Preoperative History & Physical Note    SUBJECTIVE:  Chief Complaint: Bladder Tumor    History of Present Illness: Patient is 59 year old year old male with a history of gross hematuria and known bladder tumor who presents today for endoscopic evaluation.  Patient denies acute complaints today.  Patient denies active fevers/chills/nausea/vomiting, denies chest pain/shortness of breath, denies calf pain/tenderness/swelling.  Patient denies any prior history of bleeding disorders.     @[facility]@  • chlorhexidine gluconate  15 mL Swish & Spit On Call to Procedure   • ceFAZolin  2,000 mg Intravenous On Call to Procedure       [unfilled]  ALLERGIES:  No Known Allergies  History reviewed. No pertinent past medical history.  Past Surgical History:   Procedure Laterality Date   • Colonoscopy     • Hand surgery     • Lumbar disc surgery       Social History     Tobacco Use   • Smoking status: Former Smoker     Years: 30.00     Quit date: 3/1/2013     Years since quittin.0   • Smokeless tobacco: Never Used   Substance Use Topics   • Alcohol use: Not Currently   • Drug use: Yes     Types: Marijuana     History reviewed. No pertinent family history.    Review of Systems:   Negative aside from otherwise documented as above    OBJECTIVE:  Vitals:   Patient Vitals for the past 24 hrs:   BP Temp Temp src Pulse Resp SpO2 Height Weight   21 1227 (!) 151/93 97.5 °F (36.4 °C) Temporal (!) 53 16 98 % 5' 8\" (1.727 m) 107.5 kg (236 lb 15.9 oz)     Weights:   Wt Readings from Last 3 Encounters:   21 107.5 kg (236 lb 15.9 oz)       Intake/Output last 3 complete shifts:  No intake/output data recorded.    Physical Exam:   - General:  No acute distress   - Respiratory: breathing is non-labored  - CV: well perfused extremities  - Abdomen: soft, non-distended, no guarding or rebound  - Extremities: No lower extremity swelling or edema   - Genitourinary: deferred to main operating room  - Psych: Appropriate mood & affect      Imaging Tests: No results found.     Data Review:   HEM:   No results found for: WBC, RBC, HGB, HCT, PLT  Chem:   No results found for: NA, K, CO2, BUN, CREATININE, GLUCOSE, CALCIUM  Urinalysis: No results found for: PHURN  Urine culture:   No components found for: CLTRESURN  PSA:   No results found for: PSA  Coagulation:   No results found for: INR, PTT    ASSESSMENT/PLAN:  Patient is 59 year old year old male with a history of gross hematuria who presents today for endoscopic evaluation.    I spoke to the patient regarding the patient's preoperative evaluation and concern regarding ongoing hematuria and potential serious causes (urologic malignancy, among others).  The conversation was conducted with the patient's nurse observing.      - Planned procedure: Cytoscopy, Possible Clot Evacuation, Possible Bladder Wash, Possible Bladder Biopsy, Possible Transurethral Resection of Bladder Tumor, Possible Bilateral Retrograde Pyelogram  - Procedural risks discussed to include but not limited to: bleeding, pain, bladder injury requiring indwelling catheter or open surgery, urethral stricture disease requiring catheter or additional surgeries, urinary incontinence, injury to adjacent organs, failure or need for successive procedures, PE/DVT/MI/CVA and even death, among other complications  - Patient asked good & pertinent questions, all of which were answered to the best of my ability, and informed consent for the procedure was obtained    - Patient's anticipated disposition is outpatient   - Discharge instructions, medications, and follow-up as documented in the after visit summary.    - Post operative return precautions discussed with the patient.  If they develop fevers, chills, nausea, vomiting, severe or worsening pain, new or worsening questions/comments/concerns, please seek urgent health care evaluation and emergency care as needed.     - Patient expressed understanding of the plan of care and all of their  questions were answered     Wilver Mccall, DO  [facility]      Statement Selected

## 2021-03-13 ENCOUNTER — RX RENEWAL (OUTPATIENT)
Age: 51
End: 2021-03-13

## 2021-03-17 ENCOUNTER — APPOINTMENT (OUTPATIENT)
Dept: NEUROLOGY | Facility: CLINIC | Age: 51
End: 2021-03-17
Payer: MEDICAID

## 2021-03-17 VITALS
WEIGHT: 148 LBS | TEMPERATURE: 98 F | BODY MASS INDEX: 25.27 KG/M2 | DIASTOLIC BLOOD PRESSURE: 75 MMHG | HEART RATE: 73 BPM | HEIGHT: 64 IN | SYSTOLIC BLOOD PRESSURE: 110 MMHG

## 2021-03-17 DIAGNOSIS — Z87.828 PERSONAL HISTORY OF OTHER (HEALED) PHYSICAL INJURY AND TRAUMA: ICD-10-CM

## 2021-03-17 DIAGNOSIS — R51.9 HEADACHE, UNSPECIFIED: ICD-10-CM

## 2021-03-17 DIAGNOSIS — G89.29 HEADACHE, UNSPECIFIED: ICD-10-CM

## 2021-03-17 PROCEDURE — 99072 ADDL SUPL MATRL&STAF TM PHE: CPT

## 2021-03-17 PROCEDURE — 99205 OFFICE O/P NEW HI 60 MIN: CPT

## 2021-03-17 RX ORDER — ALPRAZOLAM 0.25 MG/1
0.25 TABLET ORAL
Qty: 1 | Refills: 0 | Status: DISCONTINUED | COMMUNITY
Start: 2020-11-18 | End: 2021-03-17
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
## 2021-03-17 NOTE — REVIEW OF SYSTEMS
[Migraine Headache] : migraine headaches [Negative] : Heme/Lymph [Feeling Tired] : feeling tired [Decr. Concentrating Ability] : decreased concentrating ability [Dizziness] : dizziness [Lightheadedness] : lightheadedness [Tension Headache] : tension-type headaches [Sleep Disturbances] : sleep disturbances [Chest Pain] : chest pain [Palpitations] : palpitations [Constipation] : constipation [As Noted in HPI] : as noted in HPI [Joint Pain] : joint pain

## 2021-03-17 NOTE — REASON FOR VISIT
[Consultation] : a consultation visit [FreeTextEntry1] : referred by Dr. Edwards for evaluation of headaches , Dizziness

## 2021-03-17 NOTE — PHYSICAL EXAM
[General Appearance - Alert] : alert [General Appearance - In No Acute Distress] : in no acute distress [Oriented To Time, Place, And Person] : oriented to person, place, and time [Impaired Insight] : insight and judgment were intact [Affect] : the affect was normal [Person] : oriented to person [Place] : oriented to place [Time] : oriented to time [Concentration Intact] : normal concentrating ability [Naming Objects] : no difficulty naming common objects [Repeating Phrases] : no difficulty repeating a phrase [Fluency] : fluency intact [Comprehension] : comprehension intact [Past History] : adequate knowledge of personal past history [Cranial Nerves Optic (II)] : visual acuity intact bilaterally,  visual fields full to confrontation, pupils equal round and reactive to light [Cranial Nerves Oculomotor (III)] : extraocular motion intact [Cranial Nerves Trigeminal (V)] : facial sensation intact symmetrically [Cranial Nerves Facial (VII)] : face symmetrical [Cranial Nerves Vestibulocochlear (VIII)] : hearing was intact bilaterally [Cranial Nerves Glossopharyngeal (IX)] : tongue and palate midline [Cranial Nerves Accessory (XI - Cranial And Spinal)] : head turning and shoulder shrug symmetric [Cranial Nerves Hypoglossal (XII)] : there was no tongue deviation with protrusion [Motor Tone] : muscle tone was normal in all four extremities [Motor Strength] : muscle strength was normal in all four extremities [No Muscle Atrophy] : normal bulk in all four extremities [Sensation Tactile Decrease] : light touch was intact [Abnormal Walk] : normal gait [Balance] : balance was intact [2+] : Patella left 2+ [Registration Intact] : recent registration memory intact [Past-pointing] : there was no past-pointing [Tremor] : no tremor present [Coordination - Dysmetria Impaired Finger-to-Nose Bilateral] : not present [1+] : Ankle jerk left 1+ [Plantar Reflex Right Only] : normal on the right [Plantar Reflex Left Only] : normal on the left [PERRL With Normal Accommodation] : pupils were equal in size, round, reactive to light, with normal accommodation [Extraocular Movements] : extraocular movements were intact [Full Visual Field] : full visual field [Hearing Threshold Finger Rub Not [place]] : hearing was normal [Heart Sounds] : normal S1 and S2 [Arterial Pulses Carotid] : carotid pulses were normal with no bruits [Edema] : there was no peripheral edema [Involuntary Movements] : no involuntary movements were seen [] : no rash

## 2021-03-23 ENCOUNTER — NON-APPOINTMENT (OUTPATIENT)
Age: 51
End: 2021-03-23

## 2021-04-05 ENCOUNTER — APPOINTMENT (OUTPATIENT)
Dept: ORTHOPEDIC SURGERY | Facility: CLINIC | Age: 51
End: 2021-04-05
Payer: MEDICAID

## 2021-04-05 DIAGNOSIS — M65.9 SYNOVITIS AND TENOSYNOVITIS, UNSPECIFIED: ICD-10-CM

## 2021-04-05 PROCEDURE — 99072 ADDL SUPL MATRL&STAF TM PHE: CPT

## 2021-04-05 PROCEDURE — 99214 OFFICE O/P EST MOD 30 MIN: CPT
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
## 2021-04-05 NOTE — PHYSICAL EXAM
[de-identified] : Physical Exam:\par General: Well appearing, no acute distress\par Neurologic: A&Ox3, No focal deficits\par Head: NCAT without abrasions, lacerations, or ecchymosis to head, face, or scalp\par Eyes: No scleral icterus, no gross abnormalities\par Respiratory: Equal chest wall expansion bilaterally, no accessory muscle use\par Lymphatic: No lymphadenopathy palpated\par Skin: Warm and dry\par Psychiatric: Normal mood and affect\par \par C-Spine\par Palpation: Tenderness to paraspinal muscles and trapezius muscle\par ROM: side bending, symmetrical. Pain with extension and flexion of the neck.\par Reflexes: C5-7 [normal]\par \par Left Shoulder\par ·	Inspection/Palpation: no tenderness, swelling or deformities; tenderness over the AC joint\par ·	Range of Motion: no crepitus with ROM; Active FF 0-90; ER at side 0-10; IR to side ; Passive FF 0-90; ER at side 0-15; IR to side\par -              Arc of Motion: ER to 10 degrees, IR to 5 degrees\par ·	Strength: forward elevation in scapular plane [4/5], internal rotation [4/5], external rotation [4/5], adduction [4/5] and abduction [4/5]\par ·	Stability: no joint instability on provocative testing\par ·	Tests: unable due to lack of ROM\par \par Right Shoulder\par ·	Inspection/Palpation: no tenderness, swelling or deformities\par ·	Range of Motion: full and painless in all planes, no crepitus\par ·	Strength: forward elevation in scapular plane 5/5, internal rotation 5/5, external rotation 5/5, adduction 5/5 and abduction 5/5\par ·	Stability: no joint instability on provocative testing\par ·	Tests: Barrow test negative, Neer sign negative, negative drop arm test secondary to pain, bear hug test negative, Napolean sign negative, cross arm adduction negative, lift off sign positive, hornblowers sign negative, speeds test negative, Yergason's test negative, no bicipital groove tenderness, Edwards's Active Compression test negative
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
## 2021-04-19 ENCOUNTER — APPOINTMENT (OUTPATIENT)
Dept: NEUROLOGY | Facility: CLINIC | Age: 51
End: 2021-04-19
Payer: MEDICAID

## 2021-04-19 VITALS
TEMPERATURE: 97.3 F | HEIGHT: 64 IN | WEIGHT: 148 LBS | SYSTOLIC BLOOD PRESSURE: 103 MMHG | DIASTOLIC BLOOD PRESSURE: 67 MMHG | HEART RATE: 76 BPM | BODY MASS INDEX: 25.27 KG/M2

## 2021-04-19 DIAGNOSIS — F07.81 POSTCONCUSSIONAL SYNDROME: ICD-10-CM

## 2021-04-19 DIAGNOSIS — G43.019 MIGRAINE W/OUT AURA, INTRACTABLE, W/OUT STATUS MIGRAINOSUS: ICD-10-CM

## 2021-04-19 PROCEDURE — 99072 ADDL SUPL MATRL&STAF TM PHE: CPT

## 2021-04-19 PROCEDURE — 99214 OFFICE O/P EST MOD 30 MIN: CPT

## 2021-04-19 NOTE — DATA REVIEWED
[No studies available for review at this time.] : No studies available for review at this time. [de-identified] : Records from [facility] received on 3/23/21\par CT C-spine: no acute displaced fracture or subluxation identified. \par CT brain: No acute intracranial. \par \par
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
## 2021-04-19 NOTE — REVIEW OF SYSTEMS
[Feeling Tired] : feeling tired [Decr. Concentrating Ability] : decreased concentrating ability [Dizziness] : dizziness [Lightheadedness] : lightheadedness [Migraine Headache] : migraine headaches [Tension Headache] : tension-type headaches [Sleep Disturbances] : sleep disturbances [Chest Pain] : chest pain [Palpitations] : palpitations [Constipation] : constipation [As Noted in HPI] : as noted in HPI [Joint Pain] : joint pain [Negative] : Heme/Lymph

## 2021-04-19 NOTE — PHYSICAL EXAM
[General Appearance - Alert] : alert [General Appearance - In No Acute Distress] : in no acute distress [Oriented To Time, Place, And Person] : oriented to person, place, and time [Impaired Insight] : insight and judgment were intact [Affect] : the affect was normal [Person] : oriented to person [Place] : oriented to place [Time] : oriented to time [Registration Intact] : recent registration memory intact [Concentration Intact] : normal concentrating ability [Naming Objects] : no difficulty naming common objects [Repeating Phrases] : no difficulty repeating a phrase [Fluency] : fluency intact [Comprehension] : comprehension intact [Past History] : adequate knowledge of personal past history [Cranial Nerves Optic (II)] : visual acuity intact bilaterally,  visual fields full to confrontation, pupils equal round and reactive to light [Cranial Nerves Oculomotor (III)] : extraocular motion intact [Cranial Nerves Trigeminal (V)] : facial sensation intact symmetrically [Cranial Nerves Facial (VII)] : face symmetrical [Cranial Nerves Vestibulocochlear (VIII)] : hearing was intact bilaterally [Cranial Nerves Glossopharyngeal (IX)] : tongue and palate midline [Cranial Nerves Accessory (XI - Cranial And Spinal)] : head turning and shoulder shrug symmetric [Cranial Nerves Hypoglossal (XII)] : there was no tongue deviation with protrusion [Motor Tone] : muscle tone was normal in all four extremities [Motor Strength] : muscle strength was normal in all four extremities [No Muscle Atrophy] : normal bulk in all four extremities [Sensation Tactile Decrease] : light touch was intact [Abnormal Walk] : normal gait [2+] : Patella left 2+ [1+] : Ankle jerk left 1+ [PERRL With Normal Accommodation] : pupils were equal in size, round, reactive to light, with normal accommodation [Extraocular Movements] : extraocular movements were intact [Full Visual Field] : full visual field [Hearing Threshold Finger Rub Not [place]] : hearing was normal [Past-pointing] : there was no past-pointing [Tremor] : no tremor present [Coordination - Dysmetria Impaired Finger-to-Nose Bilateral] : not present [Plantar Reflex Right Only] : normal on the right [Plantar Reflex Left Only] : normal on the left [FreeTextEntry8] : Difficulty walking tandem

## 2021-04-19 NOTE — HISTORY OF PRESENT ILLNESS
[FreeTextEntry1] : Patient is here for follow up visit today, last seen on 2/17/21. She reports she has noted remarkable reduction in the intensity of headaches since she started Nurtec 75 mg ODT, she still continues to get few headaches per week, taking gabapentin 300 mg daily.\par \par She reports that she has been experiencing dizziness and vertigo associated with slight nausea when she is driving, usually the symptoms occur with posture head and neck changes and with motion, she denies associated tinnitus, visual blurring, double vision, or vomiting.

## 2021-04-22 ENCOUNTER — RX RENEWAL (OUTPATIENT)
Age: 51
End: 2021-04-22

## 2021-04-26 ENCOUNTER — APPOINTMENT (OUTPATIENT)
Dept: FAMILY MEDICINE | Facility: CLINIC | Age: 51
End: 2021-04-26
Payer: MEDICAID

## 2021-04-26 VITALS
HEART RATE: 70 BPM | WEIGHT: 151 LBS | OXYGEN SATURATION: 98 % | BODY MASS INDEX: 25.78 KG/M2 | TEMPERATURE: 98.2 F | DIASTOLIC BLOOD PRESSURE: 70 MMHG | HEIGHT: 64 IN | SYSTOLIC BLOOD PRESSURE: 114 MMHG

## 2021-04-26 DIAGNOSIS — F32.9 MAJOR DEPRESSIVE DISORDER, SINGLE EPISODE, UNSPECIFIED: ICD-10-CM

## 2021-04-26 DIAGNOSIS — Z12.11 ENCOUNTER FOR SCREENING FOR MALIGNANT NEOPLASM OF COLON: ICD-10-CM

## 2021-04-26 DIAGNOSIS — R92.2 INCONCLUSIVE MAMMOGRAM: ICD-10-CM

## 2021-04-26 DIAGNOSIS — Q24.5 MALFORMATION OF CORONARY VESSELS: ICD-10-CM

## 2021-04-26 DIAGNOSIS — I10 ESSENTIAL (PRIMARY) HYPERTENSION: ICD-10-CM

## 2021-04-26 DIAGNOSIS — Z00.00 ENCOUNTER FOR GENERAL ADULT MEDICAL EXAMINATION W/OUT ABNORMAL FINDINGS: ICD-10-CM

## 2021-04-26 DIAGNOSIS — G43.909 MIGRAINE, UNSPECIFIED, NOT INTRACTABLE, W/OUT STATUS MIGRAINOSUS: ICD-10-CM

## 2021-04-26 PROCEDURE — 99072 ADDL SUPL MATRL&STAF TM PHE: CPT

## 2021-04-26 PROCEDURE — 36415 COLL VENOUS BLD VENIPUNCTURE: CPT

## 2021-04-26 PROCEDURE — 99396 PREV VISIT EST AGE 40-64: CPT

## 2021-04-26 RX ORDER — PREDNISONE 5 MG/1
5 TABLET ORAL
Qty: 35 | Refills: 0 | Status: DISCONTINUED | COMMUNITY
Start: 2021-04-05 | End: 2021-04-26

## 2021-04-26 RX ORDER — METHYLPREDNISOLONE 4 MG/1
4 TABLET ORAL
Qty: 1 | Refills: 0 | Status: DISCONTINUED | COMMUNITY
Start: 2021-02-09 | End: 2021-04-26

## 2021-04-28 LAB
25(OH)D3 SERPL-MCNC: 60.8 NG/ML
ALBUMIN SERPL ELPH-MCNC: 5 G/DL
ALP BLD-CCNC: 59 U/L
ALT SERPL-CCNC: 16 U/L
ANION GAP SERPL CALC-SCNC: 10 MMOL/L
APPEARANCE: CLEAR
AST SERPL-CCNC: 19 U/L
BACTERIA: NEGATIVE
BASOPHILS # BLD AUTO: 0.04 K/UL
BASOPHILS NFR BLD AUTO: 0.5 %
BILIRUB SERPL-MCNC: 0.5 MG/DL
BILIRUBIN URINE: NEGATIVE
BLOOD URINE: NEGATIVE
BUN SERPL-MCNC: 13 MG/DL
CALCIUM SERPL-MCNC: 9.9 MG/DL
CHLORIDE SERPL-SCNC: 103 MMOL/L
CHOLEST SERPL-MCNC: 244 MG/DL
CO2 SERPL-SCNC: 25 MMOL/L
COLOR: NORMAL
CREAT SERPL-MCNC: 0.69 MG/DL
EOSINOPHIL # BLD AUTO: 0.07 K/UL
EOSINOPHIL NFR BLD AUTO: 0.8 %
ESTIMATED AVERAGE GLUCOSE: 94 MG/DL
GLUCOSE QUALITATIVE U: NEGATIVE
GLUCOSE SERPL-MCNC: 102 MG/DL
HBA1C MFR BLD HPLC: 4.9 %
HCT VFR BLD CALC: 43.9 %
HDLC SERPL-MCNC: 47 MG/DL
HGB BLD-MCNC: 14.5 G/DL
HYALINE CASTS: 1 /LPF
IMM GRANULOCYTES NFR BLD AUTO: 0.2 %
KETONES URINE: NEGATIVE
LDLC SERPL CALC-MCNC: 148 MG/DL
LEUKOCYTE ESTERASE URINE: NEGATIVE
LYMPHOCYTES # BLD AUTO: 2.25 K/UL
LYMPHOCYTES NFR BLD AUTO: 27.2 %
MAN DIFF?: NORMAL
MCHC RBC-ENTMCNC: 30.5 PG
MCHC RBC-ENTMCNC: 33 GM/DL
MCV RBC AUTO: 92.2 FL
MICROSCOPIC-UA: NORMAL
MONOCYTES # BLD AUTO: 0.5 K/UL
MONOCYTES NFR BLD AUTO: 6 %
NEUTROPHILS # BLD AUTO: 5.4 K/UL
NEUTROPHILS NFR BLD AUTO: 65.3 %
NITRITE URINE: NEGATIVE
NONHDLC SERPL-MCNC: 197 MG/DL
PH URINE: 5.5
PLATELET # BLD AUTO: 355 K/UL
POTASSIUM SERPL-SCNC: 5.1 MMOL/L
PROT SERPL-MCNC: 7.4 G/DL
PROTEIN URINE: NEGATIVE
RBC # BLD: 4.76 M/UL
RBC # FLD: 13.4 %
RED BLOOD CELLS URINE: 1 /HPF
SODIUM SERPL-SCNC: 138 MMOL/L
SPECIFIC GRAVITY URINE: 1.01
SQUAMOUS EPITHELIAL CELLS: 1 /HPF
TRIGL SERPL-MCNC: 248 MG/DL
TSH SERPL-ACNC: 0.83 UIU/ML
URATE SERPL-MCNC: 4.7 MG/DL
UROBILINOGEN URINE: NORMAL
WBC # FLD AUTO: 8.28 K/UL
WHITE BLOOD CELLS URINE: 2 /HPF

## 2021-04-29 PROBLEM — G43.909 MIGRAINE: Status: ACTIVE | Noted: 2021-03-17

## 2021-04-29 NOTE — REVIEW OF SYSTEMS
[Joint Pain] : joint pain [Joint Stiffness] : joint stiffness [Muscle Pain] : muscle pain [Headache] : headache [Negative] : Heme/Lymph

## 2021-04-29 NOTE — HEALTH RISK ASSESSMENT
[Patient reported mammogram was normal] : Patient reported mammogram was normal [Fair] :  ~his/her~ mood as fair [] : No [de-identified] : ortho, neuro,physiatry [MammogramDate] : 04/19

## 2021-04-29 NOTE — HISTORY OF PRESENT ILLNESS
[FreeTextEntry1] : CPE [de-identified] : 50 year old female for annual exam\par still bothered by injuries from a fall sustained earlier in the year

## 2021-05-10 ENCOUNTER — RX RENEWAL (OUTPATIENT)
Age: 51
End: 2021-05-10

## 2021-05-17 ENCOUNTER — APPOINTMENT (OUTPATIENT)
Dept: ORTHOPEDIC SURGERY | Facility: CLINIC | Age: 51
End: 2021-05-17
Payer: MEDICAID

## 2021-05-17 VITALS
HEIGHT: 64 IN | HEART RATE: 71 BPM | SYSTOLIC BLOOD PRESSURE: 113 MMHG | BODY MASS INDEX: 25.61 KG/M2 | DIASTOLIC BLOOD PRESSURE: 69 MMHG | WEIGHT: 150 LBS

## 2021-05-17 PROCEDURE — 99214 OFFICE O/P EST MOD 30 MIN: CPT

## 2021-05-17 PROCEDURE — 99072 ADDL SUPL MATRL&STAF TM PHE: CPT

## 2021-05-17 NOTE — ADDENDUM
[FreeTextEntry1] : Documented by Trung Cam acting as a scribe for Dr. Mckeon on 05/17/2021. \par \par All medical record entries made by the Scribe were at my, Dr. Mckeon's, direction and\par personally dictated by me on 05/17/2021. I have reviewed the chart and agree that the record\par accurately reflects my personal performance of the history, physical exam, procedure and imaging.
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
## 2021-05-17 NOTE — PHYSICAL EXAM
[de-identified] : Physical Exam:\par General: Well appearing, no acute distress\par Neurologic: A&Ox3, No focal deficits\par Head: NCAT without abrasions, lacerations, or ecchymosis to head, face, or scalp\par Eyes: No scleral icterus, no gross abnormalities\par Respiratory: Equal chest wall expansion bilaterally, no accessory muscle use\par Lymphatic: No lymphadenopathy palpated\par Skin: Warm and dry\par Psychiatric: Normal mood and affect\par \par C-Spine\par Palpation: Tenderness to paraspinal muscles and trapezius muscle\par ROM: side bending, symmetrical. Pain with extension and flexion of the neck.\par Reflexes: C5-7 [normal]\par \par Left Shoulder\par ·	Inspection/Palpation: no tenderness, swelling or deformities; tenderness over the AC joint\par ·	Range of Motion: no crepitus with ROM; Active FF 0-90; ER at side 0-15; IR to L4 ; Passive FF 0-95; ER at side 0-15;\par -              Arc of Motion: ER to 10 degrees, IR to 5 degrees\par ·	Strength: forward elevation in scapular plane [4/5], internal rotation [4/5], external rotation [4/5], adduction [4/5] and abduction [4/5]\par ·	Stability: no joint instability on provocative testing\par ·	Tests: unable due to lack of ROM\par \par Right Shoulder\par ·	Inspection/Palpation: no tenderness, swelling or deformities\par ·	Range of Motion: full and painless in all planes, no crepitus\par ·	Strength: forward elevation in scapular plane 5/5, internal rotation 5/5, external rotation 5/5, adduction 5/5 and abduction 5/5\par ·	Stability: no joint instability on provocative testing\par ·	Tests: Barrow test negative, Neer sign negative, negative drop arm test secondary to pain, bear hug test negative, Napolean sign negative, cross arm adduction negative, lift off sign positive, hornblowers sign negative, speeds test negative, Yergason's test negative, no bicipital groove tenderness, Edwards's Active Compression test negative

## 2021-05-18 LAB
ANA PAT FLD IF-IMP: ABNORMAL
ANA PATTERN: ABNORMAL
ANA SER IF-ACNC: ABNORMAL
ANA TITER: ABNORMAL

## 2021-05-20 DIAGNOSIS — Z23 ENCOUNTER FOR IMMUNIZATION: ICD-10-CM

## 2021-06-22 ENCOUNTER — APPOINTMENT (OUTPATIENT)
Dept: NEUROLOGY | Facility: CLINIC | Age: 51
End: 2021-06-22
Payer: MEDICAID

## 2021-06-22 VITALS
WEIGHT: 147 LBS | BODY MASS INDEX: 25.1 KG/M2 | TEMPERATURE: 97.6 F | HEART RATE: 72 BPM | SYSTOLIC BLOOD PRESSURE: 108 MMHG | HEIGHT: 64 IN | DIASTOLIC BLOOD PRESSURE: 70 MMHG

## 2021-06-22 DIAGNOSIS — M79.2 NEURALGIA AND NEURITIS, UNSPECIFIED: ICD-10-CM

## 2021-06-22 DIAGNOSIS — H81.90 UNSPECIFIED DISORDER OF VESTIBULAR FUNCTION, UNSPECIFIED EAR: ICD-10-CM

## 2021-06-22 DIAGNOSIS — G43.909 MIGRAINE, UNSPECIFIED, NOT INTRACTABLE, W/OUT STATUS MIGRAINOSUS: ICD-10-CM

## 2021-06-22 PROCEDURE — 99214 OFFICE O/P EST MOD 30 MIN: CPT

## 2021-06-22 PROCEDURE — 99072 ADDL SUPL MATRL&STAF TM PHE: CPT

## 2021-06-22 NOTE — DATA REVIEWED
[No studies available for review at this time.] : No studies available for review at this time. [de-identified] : ESR 4\par Records from [facility] received on 3/23/21\par CT C-spine: no acute displaced fracture or subluxation identified. \par CT brain: No acute intracranial. \par \par
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
## 2021-06-22 NOTE — HISTORY OF PRESENT ILLNESS
[FreeTextEntry1] : Patient is here for followup visit today, last seen on 4/19/21 q.d. at patient was recommended to start vestibular therapy attended 3 sessions, has noted improvement and has not had any more acute events\par \par Patient reports improvement of headaches with Nurtec, she also had Botox for cosmetic reasons which is improving her headache, she does however. complain of right parietal shooting stabbing pain that occurs about once a week lasts 15-20 minutes, not associated with visual blurring or jaw claudication. She is using gabapentin 300 mg but takes it as needed.\par \par Last ESR was 4\par \par

## 2021-06-22 NOTE — PHYSICAL EXAM
[General Appearance - Alert] : alert [General Appearance - In No Acute Distress] : in no acute distress [Oriented To Time, Place, And Person] : oriented to person, place, and time [Impaired Insight] : insight and judgment were intact [Affect] : the affect was normal [Person] : oriented to person [Place] : oriented to place [Time] : oriented to time [Registration Intact] : recent registration memory intact [Concentration Intact] : normal concentrating ability [Naming Objects] : no difficulty naming common objects [Repeating Phrases] : no difficulty repeating a phrase [Fluency] : fluency intact [Comprehension] : comprehension intact [Past History] : adequate knowledge of personal past history [Cranial Nerves Optic (II)] : visual acuity intact bilaterally,  visual fields full to confrontation, pupils equal round and reactive to light [Cranial Nerves Oculomotor (III)] : extraocular motion intact [Cranial Nerves Trigeminal (V)] : facial sensation intact symmetrically [Cranial Nerves Facial (VII)] : face symmetrical [Cranial Nerves Vestibulocochlear (VIII)] : hearing was intact bilaterally [Cranial Nerves Glossopharyngeal (IX)] : tongue and palate midline [Cranial Nerves Accessory (XI - Cranial And Spinal)] : head turning and shoulder shrug symmetric [Cranial Nerves Hypoglossal (XII)] : there was no tongue deviation with protrusion [Motor Tone] : muscle tone was normal in all four extremities [Motor Strength] : muscle strength was normal in all four extremities [No Muscle Atrophy] : normal bulk in all four extremities [Sensation Tactile Decrease] : light touch was intact [Abnormal Walk] : normal gait [2+] : Patella left 2+ [1+] : Ankle jerk left 1+ [PERRL With Normal Accommodation] : pupils were equal in size, round, reactive to light, with normal accommodation [Extraocular Movements] : extraocular movements were intact [Full Visual Field] : full visual field [Hearing Threshold Finger Rub Not [place]] : hearing was normal [Balance] : balance was intact [Past-pointing] : there was no past-pointing [Tremor] : no tremor present [Coordination - Dysmetria Impaired Finger-to-Nose Bilateral] : not present [Plantar Reflex Right Only] : normal on the right [Plantar Reflex Left Only] : normal on the left

## 2021-06-22 NOTE — REVIEW OF SYSTEMS
[Feeling Tired] : feeling tired [Decr. Concentrating Ability] : decreased concentrating ability [Migraine Headache] : migraine headaches [Tension Headache] : tension-type headaches [Sleep Disturbances] : sleep disturbances [Chest Pain] : chest pain [Palpitations] : palpitations [Constipation] : constipation [As Noted in HPI] : as noted in HPI [Joint Pain] : joint pain [Negative] : Heme/Lymph [Dizziness] : no dizziness [Lightheadedness] : no lightheadedness [Vertigo] : no vertigo

## 2021-06-29 ENCOUNTER — APPOINTMENT (OUTPATIENT)
Dept: FAMILY MEDICINE | Facility: CLINIC | Age: 51
End: 2021-06-29
Payer: MEDICAID

## 2021-06-29 VITALS
BODY MASS INDEX: 25.1 KG/M2 | WEIGHT: 147 LBS | HEART RATE: 85 BPM | OXYGEN SATURATION: 98 % | TEMPERATURE: 96.3 F | DIASTOLIC BLOOD PRESSURE: 70 MMHG | HEIGHT: 64 IN | SYSTOLIC BLOOD PRESSURE: 102 MMHG

## 2021-06-29 DIAGNOSIS — R10.9 UNSPECIFIED ABDOMINAL PAIN: ICD-10-CM

## 2021-06-29 DIAGNOSIS — R11.0 NAUSEA: ICD-10-CM

## 2021-06-29 PROCEDURE — 36415 COLL VENOUS BLD VENIPUNCTURE: CPT

## 2021-06-29 PROCEDURE — 99072 ADDL SUPL MATRL&STAF TM PHE: CPT

## 2021-06-29 PROCEDURE — 99214 OFFICE O/P EST MOD 30 MIN: CPT | Mod: 25

## 2021-06-29 RX ORDER — RIBOFLAVIN (VITAMIN B2) 400 MG
400 TABLET ORAL
Qty: 30 | Refills: 0 | Status: COMPLETED | COMMUNITY
Start: 2021-03-03 | End: 2021-06-29

## 2021-06-29 RX ORDER — FOLIC ACID 1 MG/1
1 TABLET ORAL DAILY
Qty: 90 | Refills: 3 | Status: ACTIVE | COMMUNITY
Start: 2021-06-29

## 2021-06-29 RX ORDER — MELOXICAM 7.5 MG/1
7.5 TABLET ORAL DAILY
Qty: 21 | Refills: 0 | Status: COMPLETED | COMMUNITY
Start: 2021-02-09 | End: 2021-06-29

## 2021-06-29 RX ORDER — CYCLOBENZAPRINE HYDROCHLORIDE 5 MG/1
5 TABLET, FILM COATED ORAL
Qty: 30 | Refills: 0 | Status: COMPLETED | COMMUNITY
Start: 2021-04-22 | End: 2021-06-29

## 2021-06-29 RX ORDER — ASPIRIN 81 MG
81 TABLET, DELAYED RELEASE (ENTERIC COATED) ORAL DAILY
Refills: 0 | Status: COMPLETED | COMMUNITY
End: 2021-06-29

## 2021-06-29 RX ORDER — OMEGA-3/DHA/EPA/FISH OIL 300-1000MG
400 CAPSULE ORAL
Qty: 30 | Refills: 0 | Status: COMPLETED | COMMUNITY
Start: 2021-03-03 | End: 2021-06-29

## 2021-06-29 RX ORDER — RIMEGEPANT SULFATE 75 MG/75MG
75 TABLET, ORALLY DISINTEGRATING ORAL
Qty: 8 | Refills: 2 | Status: COMPLETED | COMMUNITY
Start: 2021-03-17 | End: 2021-06-29

## 2021-06-29 RX ORDER — KETOCONAZOLE 20.5 MG/ML
2 SHAMPOO, SUSPENSION TOPICAL
Qty: 120 | Refills: 0 | Status: COMPLETED | COMMUNITY
Start: 2021-01-26 | End: 2021-06-29

## 2021-06-29 NOTE — PLAN
[FreeTextEntry1] : see med orders \par \par see radiology orders \par \par see lab orders \par \par f/u post test results,  CT scan of Abd/Pelvis likely to follow

## 2021-06-29 NOTE — HISTORY OF PRESENT ILLNESS
[FreeTextEntry8] : Pt. c/o stomach pain/ nauseas x a week \par \par 50 yo female c upper abdominal pain x 1 week, +ve bulging,  +ve nausea NO vomiting,  +ve heartburn.  Denies increased eructation\par Denies change in bowel habits \par +ve BM q3-4 days NOT an acute change \par no bloody/black stools \par no urinary complaints

## 2021-06-29 NOTE — PHYSICAL EXAM
[No Acute Distress] : no acute distress [Well Nourished] : well nourished [Well Developed] : well developed [Well-Appearing] : well-appearing [EOMI] : extraocular movements intact [Normal Outer Ear/Nose] : the outer ears and nose were normal in appearance [No JVD] : no jugular venous distention [No Respiratory Distress] : no respiratory distress  [No Accessory Muscle Use] : no accessory muscle use [Clear to Auscultation] : lungs were clear to auscultation bilaterally [Normal Rate] : normal rate  [Regular Rhythm] : with a regular rhythm [Normal S1, S2] : normal S1 and S2 [No Carotid Bruits] : no carotid bruits [No Abdominal Bruit] : a ~M bruit was not heard ~T in the abdomen [No Edema] : there was no peripheral edema [No Palpable Aorta] : no palpable aorta [No Extremity Clubbing/Cyanosis] : no extremity clubbing/cyanosis [Soft] : abdomen soft [Non-distended] : non-distended [No HSM] : no HSM [Normal Bowel Sounds] : normal bowel sounds [No CVA Tenderness] : no CVA  tenderness [Grossly Normal Strength/Tone] : grossly normal strength/tone [No Rash] : no rash [Coordination Grossly Intact] : coordination grossly intact [No Focal Deficits] : no focal deficits [Normal Gait] : normal gait [Normal Affect] : the affect was normal [Normal Mood] : the mood was normal [Normal Insight/Judgement] : insight and judgment were intact [de-identified] : +ve generalized abdominal tenderness (epigastric region, L UQ and LLQ).  +ve small supraumbilical hernia

## 2021-06-30 LAB
ALBUMIN SERPL ELPH-MCNC: 5 G/DL
ALP BLD-CCNC: 69 U/L
ALT SERPL-CCNC: 22 U/L
AMYLASE/CREAT SERPL: 52 U/L
ANION GAP SERPL CALC-SCNC: 20 MMOL/L
AST SERPL-CCNC: 21 U/L
BASOPHILS # BLD AUTO: 0.04 K/UL
BASOPHILS NFR BLD AUTO: 0.7 %
BILIRUB SERPL-MCNC: 0.4 MG/DL
BUN SERPL-MCNC: 9 MG/DL
CALCIUM SERPL-MCNC: 10 MG/DL
CHLORIDE SERPL-SCNC: 105 MMOL/L
CO2 SERPL-SCNC: 16 MMOL/L
CREAT SERPL-MCNC: 0.67 MG/DL
EOSINOPHIL # BLD AUTO: 0.13 K/UL
EOSINOPHIL NFR BLD AUTO: 2.2 %
GLUCOSE SERPL-MCNC: 104 MG/DL
HCT VFR BLD CALC: 41 %
HGB BLD-MCNC: 13.8 G/DL
IMM GRANULOCYTES NFR BLD AUTO: 0.2 %
LPL SERPL-CCNC: 29 U/L
LYMPHOCYTES # BLD AUTO: 2.11 K/UL
LYMPHOCYTES NFR BLD AUTO: 36.5 %
MAN DIFF?: NORMAL
MCHC RBC-ENTMCNC: 30.4 PG
MCHC RBC-ENTMCNC: 33.7 GM/DL
MCV RBC AUTO: 90.3 FL
MONOCYTES # BLD AUTO: 0.42 K/UL
MONOCYTES NFR BLD AUTO: 7.3 %
NEUTROPHILS # BLD AUTO: 3.07 K/UL
NEUTROPHILS NFR BLD AUTO: 53.1 %
PLATELET # BLD AUTO: 302 K/UL
POTASSIUM SERPL-SCNC: 4.3 MMOL/L
PROT SERPL-MCNC: 7.3 G/DL
RBC # BLD: 4.54 M/UL
RBC # FLD: 12.7 %
SODIUM SERPL-SCNC: 141 MMOL/L
WBC # FLD AUTO: 5.78 K/UL

## 2021-07-08 DIAGNOSIS — K42.9 UMBILICAL HERNIA W/OUT OBSTRUCTION OR GANGRENE: ICD-10-CM

## 2021-08-09 ENCOUNTER — APPOINTMENT (OUTPATIENT)
Dept: ORTHOPEDIC SURGERY | Facility: CLINIC | Age: 51
End: 2021-08-09
Payer: MEDICAID

## 2021-08-09 VITALS
HEART RATE: 75 BPM | DIASTOLIC BLOOD PRESSURE: 79 MMHG | SYSTOLIC BLOOD PRESSURE: 119 MMHG | WEIGHT: 147 LBS | BODY MASS INDEX: 25.1 KG/M2 | HEIGHT: 64 IN

## 2021-08-09 PROCEDURE — 99214 OFFICE O/P EST MOD 30 MIN: CPT
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
## 2021-08-09 NOTE — PHYSICAL EXAM
[de-identified] : Physical Exam:\par General: Well appearing, no acute distress\par Neurologic: A&Ox3, No focal deficits\par Head: NCAT without abrasions, lacerations, or ecchymosis to head, face, or scalp\par Eyes: No scleral icterus, no gross abnormalities\par Respiratory: Equal chest wall expansion bilaterally, no accessory muscle use\par Lymphatic: No lymphadenopathy palpated\par Skin: Warm and dry\par Psychiatric: Normal mood and affect\par \par C-Spine\par Palpation: Tenderness to paraspinal muscles and trapezius muscle\par ROM: side bending, symmetrical. Pain with extension and flexion of the neck.\par Reflexes: C5-7 [normal]\par \par Left Shoulder\par ·	Inspection/Palpation: no tenderness, swelling or deformities; tenderness over the AC joint\par ·	Range of Motion: no crepitus with ROM; Active FF 0-105; ER at side 0-35; IR to L4 ; Passive FF 0-125; ER at side 0-40;\par -              Arc of Motion: ER to 75 degrees, IR to 40 degrees\par ·	Strength: forward elevation in scapular plane [4/5], internal rotation [4/5], external rotation [4/5], adduction [4/5] and abduction [4/5]\par ·	Stability: no joint instability on provocative testing\par ·	Tests: Barrow test negative, Neer sign negative, negative drop arm test secondary to pain, bear hug test negative, Napolean sign negative, cross arm adduction negative, lift off sign positive, hornblowers sign negative, speeds test negative, Yergason's test negative, no bicipital groove tenderness, Edwards's Active Compression test negative\par \par Right Shoulder\par ·	Inspection/Palpation: no tenderness, swelling or deformities\par ·	Range of Motion: full and painless in all planes, no crepitus\par ·	Strength: forward elevation in scapular plane 5/5, internal rotation 5/5, external rotation 5/5, adduction 5/5 and abduction 5/5\par ·	Stability: no joint instability on provocative testing\par ·	Tests: Barrow test negative, Neer sign negative, negative drop arm test secondary to pain, bear hug test negative, Napolean sign negative, cross arm adduction negative, lift off sign positive, hornblowers sign negative, speeds test negative, Yergason's test negative, no bicipital groove tenderness, Edwards's Active Compression test negative
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
## 2021-08-09 NOTE — ADDENDUM
[FreeTextEntry1] : Documented by Trung Cam acting as a scribe for Dr. Mckeon on 08/09/2021. \par \par All medical record entries made by the Scribe were at my, Dr. Mckeon's, direction and\par personally dictated by me on 08/09/2021. I have reviewed the chart and agree that the record\par accurately reflects my personal performance of the history, physical exam, procedure and imaging.

## 2021-09-13 ENCOUNTER — APPOINTMENT (OUTPATIENT)
Dept: FAMILY MEDICINE | Facility: CLINIC | Age: 51
End: 2021-09-13
Payer: MEDICAID

## 2021-09-13 ENCOUNTER — RX RENEWAL (OUTPATIENT)
Age: 51
End: 2021-09-13

## 2021-09-13 VITALS
WEIGHT: 148 LBS | OXYGEN SATURATION: 97 % | DIASTOLIC BLOOD PRESSURE: 78 MMHG | HEIGHT: 64 IN | HEART RATE: 79 BPM | BODY MASS INDEX: 25.27 KG/M2 | SYSTOLIC BLOOD PRESSURE: 110 MMHG | TEMPERATURE: 97.6 F

## 2021-09-13 DIAGNOSIS — K29.70 GASTRITIS, UNSPECIFIED, W/OUT BLEEDING: ICD-10-CM

## 2021-09-13 DIAGNOSIS — E78.00 PURE HYPERCHOLESTEROLEMIA, UNSPECIFIED: ICD-10-CM

## 2021-09-13 DIAGNOSIS — R06.02 SHORTNESS OF BREATH: ICD-10-CM

## 2021-09-13 PROCEDURE — 99214 OFFICE O/P EST MOD 30 MIN: CPT

## 2021-09-13 NOTE — END OF VISIT
[FreeTextEntry3] : Medical record entries made by the scribe today today, were at my direction and personally dictated to them by me, Dr. Molly Vasques on Sep 13, 2021. I have reviewed the chart and agree that the record accurately reflects my personal performance of the history, physical exam, assessment, and plan.\par

## 2021-09-13 NOTE — ADDENDUM
[FreeTextEntry1] : I, Sharlene Sultana acting as a scribe for Dr. Molly Vasques on Sep 13, 2021  at 11:46 AM\par

## 2021-09-13 NOTE — PHYSICAL EXAM
[No Acute Distress] : no acute distress [Well Nourished] : well nourished [Well Developed] : well developed [Well-Appearing] : well-appearing [Normal Sclera/Conjunctiva] : normal sclera/conjunctiva [PERRL] : pupils equal round and reactive to light [EOMI] : extraocular movements intact [Normal Outer Ear/Nose] : the outer ears and nose were normal in appearance [Normal Oropharynx] : the oropharynx was normal [No JVD] : no jugular venous distention [No Lymphadenopathy] : no lymphadenopathy [Supple] : supple [Thyroid Normal, No Nodules] : the thyroid was normal and there were no nodules present [No Respiratory Distress] : no respiratory distress  [No Accessory Muscle Use] : no accessory muscle use [Clear to Auscultation] : lungs were clear to auscultation bilaterally [Normal Rate] : normal rate  [Regular Rhythm] : with a regular rhythm [Normal S1, S2] : normal S1 and S2 [No Murmur] : no murmur heard [No Carotid Bruits] : no carotid bruits [No Abdominal Bruit] : a ~M bruit was not heard ~T in the abdomen [No Varicosities] : no varicosities [Pedal Pulses Present] : the pedal pulses are present [No Edema] : there was no peripheral edema [No Palpable Aorta] : no palpable aorta [No Extremity Clubbing/Cyanosis] : no extremity clubbing/cyanosis [Soft] : abdomen soft [Non Tender] : non-tender [Non-distended] : non-distended [No Masses] : no abdominal mass palpated [No HSM] : no HSM [Normal Bowel Sounds] : normal bowel sounds [Normal Posterior Cervical Nodes] : no posterior cervical lymphadenopathy [Normal Anterior Cervical Nodes] : no anterior cervical lymphadenopathy [No CVA Tenderness] : no CVA  tenderness [No Spinal Tenderness] : no spinal tenderness [No Joint Swelling] : no joint swelling [Grossly Normal Strength/Tone] : grossly normal strength/tone [No Rash] : no rash [Coordination Grossly Intact] : coordination grossly intact [No Focal Deficits] : no focal deficits [Normal Gait] : normal gait [Deep Tendon Reflexes (DTR)] : deep tendon reflexes were 2+ and symmetric [Normal Affect] : the affect was normal [Normal Insight/Judgement] : insight and judgment were intact [de-identified] : ventral hernia

## 2021-09-13 NOTE — REVIEW OF SYSTEMS
[Hot Flashes] : hot flashes [Night Sweats] : night sweats [Abdominal Pain] : abdominal pain [Dizziness] : dizziness [Negative] : Heme/Lymph [FreeTextEntry6] : HPI

## 2021-09-13 NOTE — PLAN
[FreeTextEntry1] : \par - Explained to pt symptoms are not likely related to hernia \par - Recommending patient make appt with GI so she can undergo an EGD \par - Instructed to start taking Pantoprazole 40 mg. \par - Night sweats/Hot flashes: Start Effexor 37.5 mg \par - Advised making appt with cardiology to evaluate SOB that occurs at night.  \par - Labs drawn in office today.\par Pt advised to go to ER is symtpoms escalate rpior to specialist appointment otherswise instructed to follow up with me in two weeks\par

## 2021-09-13 NOTE — HISTORY OF PRESENT ILLNESS
[FreeTextEntry8] : Pt presents today with c/o abdominal pain x2 days. Associated symptoms include mid abdominal burning and lightheadedness. Patient states she has been eating and drinking normally. No known triggers. Denies eating anything out of the ordinary. States she has a ventral hernia, initially dx by a plastic surgeon and then [facility] Mckeon. Abdominal US was normal. Patient unsure if it could be related to her current symptoms. She admits she never took Pantoprazole that was previously rx'd by [facility] Mckeon. \par \par Patient also c/o night sweats/hot flashes. States its causing her to have interrupted sleep. Thinks she might likely be going through menopause. Has an IUD that she would like taken out. \par \par Also c/o difficulty breathing. Only occurs at night while laying down in bed.

## 2021-09-14 LAB
ALBUMIN SERPL ELPH-MCNC: 5 G/DL
ALP BLD-CCNC: 59 U/L
ALT SERPL-CCNC: 24 U/L
ANION GAP SERPL CALC-SCNC: 15 MMOL/L
AST SERPL-CCNC: 21 U/L
BASOPHILS # BLD AUTO: 0.03 K/UL
BASOPHILS NFR BLD AUTO: 0.4 %
BILIRUB SERPL-MCNC: 0.5 MG/DL
BUN SERPL-MCNC: 10 MG/DL
CALCIUM SERPL-MCNC: 10 MG/DL
CHLORIDE SERPL-SCNC: 105 MMOL/L
CHOLEST SERPL-MCNC: 220 MG/DL
CO2 SERPL-SCNC: 22 MMOL/L
CREAT SERPL-MCNC: 0.64 MG/DL
EOSINOPHIL # BLD AUTO: 0.07 K/UL
EOSINOPHIL NFR BLD AUTO: 1 %
ESTIMATED AVERAGE GLUCOSE: 105 MG/DL
GLUCOSE SERPL-MCNC: 113 MG/DL
HBA1C MFR BLD HPLC: 5.3 %
HCT VFR BLD CALC: 42.8 %
HDLC SERPL-MCNC: 42 MG/DL
HGB BLD-MCNC: 13.7 G/DL
IMM GRANULOCYTES NFR BLD AUTO: 0.1 %
LDLC SERPL CALC-MCNC: 123 MG/DL
LYMPHOCYTES # BLD AUTO: 2.42 K/UL
LYMPHOCYTES NFR BLD AUTO: 33.4 %
MAN DIFF?: NORMAL
MCHC RBC-ENTMCNC: 29.9 PG
MCHC RBC-ENTMCNC: 32 GM/DL
MCV RBC AUTO: 93.4 FL
MONOCYTES # BLD AUTO: 0.35 K/UL
MONOCYTES NFR BLD AUTO: 4.8 %
NEUTROPHILS # BLD AUTO: 4.37 K/UL
NEUTROPHILS NFR BLD AUTO: 60.3 %
NONHDLC SERPL-MCNC: 178 MG/DL
NT-PROBNP SERPL-MCNC: 37 PG/ML
PLATELET # BLD AUTO: 337 K/UL
POTASSIUM SERPL-SCNC: 4.1 MMOL/L
PROT SERPL-MCNC: 7.3 G/DL
RBC # BLD: 4.58 M/UL
RBC # FLD: 13.6 %
SODIUM SERPL-SCNC: 142 MMOL/L
TRIGL SERPL-MCNC: 273 MG/DL
TSH SERPL-ACNC: 1.09 UIU/ML
WBC # FLD AUTO: 7.25 K/UL

## 2021-09-29 ENCOUNTER — APPOINTMENT (OUTPATIENT)
Dept: FAMILY MEDICINE | Facility: CLINIC | Age: 51
End: 2021-09-29
Payer: MEDICAID

## 2021-09-29 VITALS
TEMPERATURE: 97.8 F | HEART RATE: 72 BPM | DIASTOLIC BLOOD PRESSURE: 70 MMHG | OXYGEN SATURATION: 98 % | SYSTOLIC BLOOD PRESSURE: 102 MMHG | BODY MASS INDEX: 25.61 KG/M2 | WEIGHT: 150 LBS | HEIGHT: 64 IN

## 2021-09-29 DIAGNOSIS — R23.2 FLUSHING: ICD-10-CM

## 2021-09-29 DIAGNOSIS — R10.9 UNSPECIFIED ABDOMINAL PAIN: ICD-10-CM

## 2021-09-29 PROCEDURE — 99214 OFFICE O/P EST MOD 30 MIN: CPT

## 2021-09-29 NOTE — ADDENDUM
[FreeTextEntry1] : I, Sharlene Sultana acting as a scribe for Dr. Molly Vasques on Sep 29, 2021  at 2:38 PM\par

## 2021-09-29 NOTE — PLAN
[FreeTextEntry1] : \par - Again advised making appt with GI for workup. Provided patient with Dr. Kim's and Dr. Janey Mason's information. \par - Due to abdominal pain/tenderness in epigastrium will send for abdominal CT scan \par - Hot flashes/night sweats: C/w Venlafaxine 37.5 mg

## 2021-09-29 NOTE — END OF VISIT
[FreeTextEntry3] : Medical record entries made by the scribe today today, were at my direction and personally dictated to them by me, Dr. Molly Vasques on Sep 29, 2021. I have reviewed the chart and agree that the record accurately reflects my personal performance of the history, physical exam, assessment, and plan.\par

## 2021-09-29 NOTE — HISTORY OF PRESENT ILLNESS
[FreeTextEntry1] : Follow up abdominal discomfort  [de-identified] : LYNN is a 51 year female here to follow up on upper abdominal discomfort. Describes it as a stabbing pain. Presents with strenuous activity. States there was no improvement while taking pantoprazole. Denies worsening symptoms. Notes she never made appointment with GI because she couldn’t get in contact with the appointment hotline. \par \par In terms of the hot flashes/night sweats, patient finds significant improvement on Effexor. \par \par

## 2021-09-30 ENCOUNTER — RX RENEWAL (OUTPATIENT)
Age: 51
End: 2021-09-30

## 2021-10-05 PROBLEM — Z00.00 ENCOUNTER FOR PREVENTIVE HEALTH EXAMINATION: Noted: 2021-10-05

## 2021-10-11 ENCOUNTER — APPOINTMENT (OUTPATIENT)
Dept: ORTHOPEDIC SURGERY | Facility: CLINIC | Age: 51
End: 2021-10-11

## 2021-10-28 ENCOUNTER — APPOINTMENT (OUTPATIENT)
Dept: ORTHOPEDIC SURGERY | Facility: CLINIC | Age: 51
End: 2021-10-28
Payer: MEDICAID

## 2021-10-28 VITALS
SYSTOLIC BLOOD PRESSURE: 109 MMHG | HEART RATE: 66 BPM | HEIGHT: 64 IN | WEIGHT: 150 LBS | DIASTOLIC BLOOD PRESSURE: 71 MMHG | BODY MASS INDEX: 25.61 KG/M2

## 2021-10-28 PROCEDURE — 20610 DRAIN/INJ JOINT/BURSA W/O US: CPT | Mod: LT

## 2021-10-28 PROCEDURE — 99214 OFFICE O/P EST MOD 30 MIN: CPT | Mod: 25

## 2021-10-28 RX ORDER — GABAPENTIN 300 MG/1
300 CAPSULE ORAL
Qty: 30 | Refills: 1 | Status: DISCONTINUED | COMMUNITY
Start: 2021-03-03 | End: 2021-10-28

## 2021-10-28 NOTE — DISCUSSION/SUMMARY
[de-identified] : LYNN is a 50 year old female who presents today for follow up evaluation of L shoulder pain. She fell on ice (02/06/2021) directly on L shoulder. An MRI was performed that month that did not reveal  full thickness RTC tearing. She does not have weakness but she did have symptoms of traumatic adhesive capsulitis with a cortisone inj performed. She did formal PT but not a full course as prescribed. She reports prior to fall, her shoulder pain was improving from cortisone injection and PT but not entirely. She feels now that her stiffness is coming back and pain.  We recommended a second cortisone injection at her last visit that she deferred of which she comes in to receive today.\par \par We had a thorough discussion regarding the nature of her pain, the pathophysiology, as well as all treatment options. She elects for a cortisone injection today of which she tolerated well and starting PT within 3-5 days as prescribed. We will see her back for ROM check in 6-8 weeks of performing. She agrees with the above plan and all questions were answered.\par

## 2021-10-28 NOTE — HISTORY OF PRESENT ILLNESS
[de-identified] : LYNN is a 50 year old female who presents today for follow up evaluation of L shoulder pain. She fell on ice (02/06/2021) directly on L shoulder. An MRI was performed that month that did not reveal  full thickness RTC tearing. She does not have weakness but she did have symptoms of traumatic adhesive capsulitis with a cortisone inj performed. She did formal PT but not a full course as prescribed. She reports prior to fall, her shoulder pain was improving from cortisone injection and PT but not entirely. She feels now that her stiffness is coming back and pain.  We recommended a second cortisone injection at her last visit that she deferred of which she comes in to receive today.

## 2021-10-28 NOTE — PROCEDURE
[de-identified] : Injection: Left shoulder (Subacromial).\par Indication: Adhesive Capsulitis\par \par A discussion was had with the patient regarding this procedure and all questions were answered. All risks, benefits and alternatives were discussed. These included but were not limited to bleeding, infection, and allergic reaction. Alcohol was used to clean the skin, and betadine was used to sterilize and prep the area in the posterior aspect of the left shoulder. Ethyl chloride spray was then used as a topical anesthetic. A 22-gauge needle was used to inject 3cc 1% xylocaine, 2cc 0.25% bupivacaine and 1cc of 40mg/mL triamcinolone acetonide into the left subacromial space. A sterile bandage was then applied. The patient tolerated the procedure well and there were no complications.\par

## 2021-10-28 NOTE — PHYSICAL EXAM
[de-identified] : Physical Exam:\par General: Well appearing, no acute distress\par Neurologic: A&Ox3, No focal deficits\par Head: NCAT without abrasions, lacerations, or ecchymosis to head, face, or scalp\par Eyes: No scleral icterus, no gross abnormalities\par Respiratory: Equal chest wall expansion bilaterally, no accessory muscle use\par Lymphatic: No lymphadenopathy palpated\par Skin: Warm and dry\par Psychiatric: Normal mood and affect\par \par C-Spine\par Palpation: Tenderness to paraspinal muscles and trapezius muscle\par ROM: side bending, symmetrical. Pain with extension and flexion of the neck.\par Reflexes: C5-7 [normal]\par \par Left Shoulder\par ·	Inspection/Palpation: no tenderness, swelling or deformities; tenderness over the AC joint\par ·	Range of Motion: no crepitus with ROM; Active FF 0-135; ER at side 0-35; IR to L4 ; Passive FF 0-135; ER at side 0-40;\par -              Arc of Motion: ER to 75 degrees, IR to 40 degrees\par ·	Strength: forward elevation in scapular plane [4/5], internal rotation [4/5], external rotation [4/5], adduction [4/5] and abduction [4/5]\par ·	Stability: no joint instability on provocative testing\par ·	Tests: Barrow test negative, Neer sign negative, negative drop arm test secondary to pain, bear hug test negative, Napolean sign negative, cross arm adduction negative, lift off sign positive, hornblowers sign negative, speeds test negative, Yergason's test negative, no bicipital groove tenderness, Edwards's Active Compression test negative\par \par Right Shoulder\par ·	Inspection/Palpation: no tenderness, swelling or deformities\par ·	Range of Motion: full and painless in all planes, no crepitus\par ·	Strength: forward elevation in scapular plane 5/5, internal rotation 5/5, external rotation 5/5, adduction 5/5 and abduction 5/5\par ·	Stability: no joint instability on provocative testing\par ·	Tests: Barrow test negative, Neer sign negative, negative drop arm test secondary to pain, bear hug test negative, Napolean sign negative, cross arm adduction negative, lift off sign positive, hornblowers sign negative, speeds test negative, Yergason's test negative, no bicipital groove tenderness, Edwards's Active Compression test negative

## 2021-11-15 ENCOUNTER — RX RENEWAL (OUTPATIENT)
Age: 51
End: 2021-11-15

## 2021-11-16 ENCOUNTER — RX RENEWAL (OUTPATIENT)
Age: 51
End: 2021-11-16

## 2021-11-29 ENCOUNTER — RX RENEWAL (OUTPATIENT)
Age: 51
End: 2021-11-29

## 2021-12-06 ENCOUNTER — APPOINTMENT (OUTPATIENT)
Dept: OBGYN | Facility: CLINIC | Age: 51
End: 2021-12-06
Payer: MEDICAID

## 2021-12-06 VITALS
HEIGHT: 64 IN | SYSTOLIC BLOOD PRESSURE: 120 MMHG | WEIGHT: 148 LBS | DIASTOLIC BLOOD PRESSURE: 70 MMHG | BODY MASS INDEX: 25.27 KG/M2

## 2021-12-06 DIAGNOSIS — Z11.51 ENCOUNTER FOR SCREENING FOR HUMAN PAPILLOMAVIRUS (HPV): ICD-10-CM

## 2021-12-06 DIAGNOSIS — Z12.4 ENCOUNTER FOR SCREENING FOR MALIGNANT NEOPLASM OF CERVIX: ICD-10-CM

## 2021-12-06 DIAGNOSIS — Z01.419 ENCOUNTER FOR GYNECOLOGICAL EXAMINATION (GENERAL) (ROUTINE) W/OUT ABNORMAL FINDINGS: ICD-10-CM

## 2021-12-06 DIAGNOSIS — Z80.3 FAMILY HISTORY OF MALIGNANT NEOPLASM OF BREAST: ICD-10-CM

## 2021-12-06 DIAGNOSIS — Z78.9 OTHER SPECIFIED HEALTH STATUS: ICD-10-CM

## 2021-12-06 DIAGNOSIS — Z63.5 DISRUPTION OF FAMILY BY SEPARATION AND DIVORCE: ICD-10-CM

## 2021-12-06 LAB
BILIRUB UR QL STRIP: NORMAL
COLLECTION METHOD: NORMAL
GLUCOSE UR-MCNC: NORMAL
HCG UR QL: 1 EU/DL
HGB UR QL STRIP.AUTO: ABNORMAL
KETONES UR-MCNC: NORMAL
LEUKOCYTE ESTERASE UR QL STRIP: NORMAL
NITRITE UR QL STRIP: NORMAL
PH UR STRIP: 7
PROT UR STRIP-MCNC: NORMAL
SP GR UR STRIP: 1.02

## 2021-12-06 PROCEDURE — 81003 URINALYSIS AUTO W/O SCOPE: CPT | Mod: QW

## 2021-12-06 PROCEDURE — 99386 PREV VISIT NEW AGE 40-64: CPT

## 2021-12-06 RX ORDER — FOLIC ACID 1 MG/1
1 TABLET ORAL
Qty: 90 | Refills: 0 | Status: ACTIVE | COMMUNITY
Start: 2021-11-15

## 2021-12-06 RX ORDER — CYCLOBENZAPRINE HYDROCHLORIDE 5 MG/1
5 TABLET, FILM COATED ORAL
Qty: 30 | Refills: 0 | Status: ACTIVE | COMMUNITY
Start: 2021-05-10 | End: 1900-01-01

## 2021-12-06 RX ORDER — SODIUM SULFATE, POTASSIUM SULFATE, MAGNESIUM SULFATE 17.5; 3.13; 1.6 G/ML; G/ML; G/ML
17.5-3.13-1.6 SOLUTION, CONCENTRATE ORAL
Qty: 354 | Refills: 0 | Status: COMPLETED | COMMUNITY
Start: 2021-10-08

## 2021-12-06 RX ORDER — ASCORBIC ACID 500 MG
500 TABLET ORAL
Refills: 0 | Status: ACTIVE | COMMUNITY

## 2021-12-06 RX ORDER — PSYLLIUM HUSK 0.4 G
CAPSULE ORAL
Refills: 0 | Status: ACTIVE | COMMUNITY

## 2021-12-06 RX ORDER — METHOTREXATE 2.5 MG/1
2.5 TABLET ORAL
Qty: 24 | Refills: 0 | Status: ACTIVE | COMMUNITY
Start: 2021-03-22

## 2021-12-06 SDOH — SOCIAL STABILITY - SOCIAL INSECURITY: DISRUPTION OF FAMILY BY SEPARATION AND DIVORCE: Z63.5

## 2021-12-06 NOTE — HISTORY OF PRESENT ILLNESS
[Patient reported PAP Smear was normal] : Patient reported PAP Smear was normal [Irregular Cycle Intervals] : are  irregular [Dysmenorrhea] : dysmenorrhea [Y] : Positive pregnancy history [Hot Flashes] : hot flashes [Menarche Age: ____] : age at menarche was [unfilled] [No] : Patient does not have concerns regarding sex [Currently Active] : currently active [Men] : men [Yes] : Yes [Condoms] : Condoms [TextBox_4] : Pt presents for an annual. [PapSmeardate] : 2020 [LMPDate] : 07/01/2021 [PGHxTotal] : 2 [[facility] : 2 [[facility] : 2 [TextBox_6] : Vaginal Dryness [FreeTextEntry1] : 07/01/2021

## 2021-12-06 NOTE — PHYSICAL EXAM
[Appropriately responsive] : appropriately responsive [Alert] : alert [No Acute Distress] : no acute distress [No Lymphadenopathy] : no lymphadenopathy [Soft] : soft [Non-tender] : non-tender [Non-distended] : non-distended [No Lesions] : no lesions [No Mass] : no mass [Oriented x3] : oriented x3 [] : implants [No Masses] : no breast masses were palpable [Labia Majora] : normal [Labia Minora] : normal [IUD String] : an IUD string was noted [Normal] : normal [Uterine Adnexae] : normal

## 2021-12-06 NOTE — REVIEW OF SYSTEMS
[Abdominal Pain] : abdominal pain [Urgency] : urgency [Dizziness] : dizziness [Anxiety] : anxiety [Sleep Disturbances] : sleep disturbances [Hot Flashes] : hot flashes [Negative] : Heme/Lymph [FreeTextEntry9] : Neck Pain [de-identified] : Vertigo [de-identified] : Excessive Sweating, Excessive Thirst

## 2021-12-08 LAB — HPV HIGH+LOW RISK DNA PNL CVX: NOT DETECTED

## 2021-12-13 LAB — CYTOLOGY CVX/VAG DOC THIN PREP: ABNORMAL

## 2022-01-04 ENCOUNTER — APPOINTMENT (OUTPATIENT)
Dept: ORTHOPEDIC SURGERY | Facility: CLINIC | Age: 52
End: 2022-01-04

## 2022-02-17 ENCOUNTER — APPOINTMENT (OUTPATIENT)
Dept: NEUROLOGY | Facility: CLINIC | Age: 52
End: 2022-02-17

## 2022-02-28 ENCOUNTER — RX RENEWAL (OUTPATIENT)
Age: 52
End: 2022-02-28

## 2022-04-26 ENCOUNTER — APPOINTMENT (OUTPATIENT)
Dept: ORTHOPEDIC SURGERY | Facility: CLINIC | Age: 52
End: 2022-04-26
Payer: SELF-PAY

## 2022-04-26 DIAGNOSIS — S43.102A UNSPECIFIED DISLOCATION OF LEFT ACROMIOCLAVICULAR JOINT, INITIAL ENCOUNTER: ICD-10-CM

## 2022-04-26 PROCEDURE — 99213 OFFICE O/P EST LOW 20 MIN: CPT

## 2022-04-26 NOTE — ADDENDUM
[FreeTextEntry1] : Documented by Trung Cam acting as a scribe for Dr. Mckeon on 04/26/2022. \par \par All medical record entries made by the Scribe were at my, Dr. Mckeon's, direction and\par personally dictated by me on 04/26/2022. I have reviewed the chart and agree that the record\par accurately reflects my personal performance of the history, physical exam, procedure and imaging.

## 2022-04-26 NOTE — PHYSICAL EXAM
[de-identified] : Physical Exam:\par General: Well appearing, no acute distress\par Neurologic: A&Ox3, No focal deficits\par Head: NCAT without abrasions, lacerations, or ecchymosis to head, face, or scalp\par Eyes: No scleral icterus, no gross abnormalities\par Respiratory: Equal chest wall expansion bilaterally, no accessory muscle use\par Lymphatic: No lymphadenopathy palpated\par Skin: Warm and dry\par Psychiatric: Normal mood and affect\par \par C-Spine\par Palpation: Tenderness to paraspinal muscles and trapezius muscle\par ROM: side bending, symmetrical. Pain with extension and flexion of the neck.\par Reflexes: C5-7 [normal]\par \par Left Shoulder\par ·	Inspection/Palpation: no tenderness, swelling or deformities; tenderness over the AC joint\par ·	Range of Motion: no crepitus with ROM; Active FF 0-95; ER at side 0-20; IR to L5 ; Passive FF 0-95 w/ resistance ; ER at side 0-20 w/ resistance ;\par -              Arc of Motion: ER to 75 degrees, IR to 40 degrees\par ·	Strength: forward elevation in scapular plane 5/5, internal rotation 4+/5, external rotation 5/5, adduction 5/5 and abduction 4+/5 w/ pain  \par ·	Stability: no joint instability on provocative testing\par ·	Tests: Barrow test negative, Neer sign negative, positive drop arm test secondary to pain, bear hug test positive, Napolean sign positive, cross arm adduction positive, lift off sign positive, hornblowers sign negative, speeds test negative, Yergason's test negative, no bicipital groove tenderness, Edwards's Active Compression test negative\par \par Right Shoulder\par ·	Inspection/Palpation: no tenderness, swelling or deformities\par ·	Range of Motion: full and painless in all planes, no crepitus\par ·	Strength: forward elevation in scapular plane 5/5, internal rotation 5/5, external rotation 5/5, adduction 5/5 and abduction 5/5\par ·	Stability: no joint instability on provocative testing\par ·	Tests: Barrow test negative, Neer sign negative, negative drop arm test secondary to pain, bear hug test negative, Napolean sign negative, cross arm adduction negative, lift off sign positive, hornblowers sign negative, speeds test negative, Yergason's test negative, no bicipital groove tenderness, Edwards's Active Compression test negative [de-identified] : Procedure: MRI of Left shoulder ([facility])\par Dated: 02/18/2022\par \par Impression:\par \par The supraspinatus tendon demonstrates increasing inhomogeneity at its anterolateral attachment site on humerus representing insertional tendinosis/ tendinopathy and there is increasing accumulating of fluid in the subacromial bursa representing bursitis\par \par Shallow partial thickness bursal surface supraspinatus tendon tearing again measuring approximately 7 mm. \par \par Anterolaterally downsloping type II acromion that abuts the underlying supraspinatus \par \par Paucity of fluid in the long head of the biceps tendon sheath \par \par Focal region of cortical erosion at the anterolateral humeral head convexity. \par \par

## 2022-04-26 NOTE — HISTORY OF PRESENT ILLNESS
[Worsening] : worsening [___ yrs] : [unfilled] year(s) ago [5] : a current pain level of 5/10 [7] : an average pain level of 7/10 [Lifting] : worsened by lifting [None] : No relieving factors are noted [de-identified] : LYNN is a 50 year old female who presents today for follow up evaluation of L shoulder pain. At last visit, Oct 2021, patient received SA cortisone injection that provided her 6-10 weeks of relief. Patient was scheduled to see us, but her insurance was no longer accepted by us. Hence, she was unable to follow up. She saw Dr. Connelly ([place]) who advised patient on surgery. She reports worsening shoulder pain at this time. She reports radiating pain down to her hand. She believes worsening ROM and strength in FF, ER and IR. She presents today with MRI from [facility]. MRI reveals: \par \par The supraspinatus tendon demonstrates increasing inhomogeneity at its anterolateral attachment site on humerus representing insertional tendinosis/ tendinopathy and there is increasing accumulating og fluid in the subacromial bursa representing bursitis\par \par Shallow partial thickness bursal surface supraspinatus tendon tearing again measuring approximately 7 mm. \par \par Anterolaterally downsloping type II acromion that abuts the underlying supraspinatus \par \par Paucity of fluid in the long head of the biceps tendon sheath \par \par Focal region of cortical erosion at the anterolateral humeral head convexity.

## 2022-04-26 NOTE — DISCUSSION/SUMMARY
[de-identified] : LYNN is a 50 year old female who presents today for follow up evaluation of L shoulder pain. At last visit, Oct 2021, patient received SA cortisone injection that provided her 6-10 weeks of relief. Patient was scheduled to see us, but her insurance was no longer accepted by us. Hence, she was unable to follow up. She saw Dr. Connelly ([place]) who advised patient on surgery. She reports worsening shoulder pain at this time. She reports radiating pain down to her hand. She believes worsening ROM and strength in FF, ER and IR. She presents today with MRI from [facility].\par \par We had a thorough discussion regarding the nature of her pain, the pathophysiology, as well as all treatment options. Based on her clinical exam, past radiographs, and MRI findings, she has RTC tearing, and chronic stiffness in shoulder joint of which I discussed operative and non-operative treatment modalities. Patient has tried and failed all conservative treatment options including the activity modification, HEP, PT, cortisone injections and NSAIDs. Patient is considering surgical treatment . All the risks and benefits of a shoulder arthroscopy with capsular release, JORDI, DEB, possible RTC repair vs reconstruction, SAD, acromioplasty and extensive debridement were discussed with the patient. Risks include, but are not limited to, infection, bleeding, dislocation, nerve injury, blood clots and other possible medical complications, which were discussed with the patient. Also the risks of possible readmission were discussed with the patient. Patient completely understands all risks and benefits. The need for postoperative DVT prophylaxis discussed with the patient as well. The patient was given no assurances that all the symptoms will be alleviated. A packet was given to patient that describes pathophysiology, entire procedure, likely outcome, risks, and benefits, along with post operative physical therapy plan. Patient completely understands all this. She has been advised to get medical clearance before the procedure, in order to decrease complication risk. She will be changing her insurance, and will follow up for booking surgery. She agrees with the above plan and all questions were answered. \par \par \par \par \par \par \par

## 2022-05-23 ENCOUNTER — RX RENEWAL (OUTPATIENT)
Age: 52
End: 2022-05-23

## 2022-06-06 ENCOUNTER — APPOINTMENT (OUTPATIENT)
Dept: ORTHOPEDIC SURGERY | Facility: CLINIC | Age: 52
End: 2022-06-06
Payer: MEDICAID

## 2022-06-06 DIAGNOSIS — M75.40 IMPINGEMENT SYNDROME OF UNSPECIFIED SHOULDER: ICD-10-CM

## 2022-06-06 PROCEDURE — 99214 OFFICE O/P EST MOD 30 MIN: CPT

## 2022-06-06 NOTE — DISCUSSION/SUMMARY
[de-identified] : LYNN is a 50 year old female who presents today for follow up evaluation of L shoulder pain. Patient has been booked for surgery on 27 June 2022. She presents to discuss her MRI. To recap her past HPI. At last visit, Oct 2021, patient received SA cortisone injection that provided her 6-10 weeks of relief. Patient was scheduled to see us, but her insurance was no longer accepted by us. Hence, she was unable to follow up. She saw Dr. Connelly ([place]) who advised patient on surgery. She reports worsening shoulder pain at this time. She reports radiating pain down to her hand. She believes worsening ROM and strength in FF, ER and IR. She presents today with MRI from [facility]. \par \par We had a thorough discussion regarding the nature of her pain, the pathophysiology, as well as all treatment options. Based on her clinical exam, past radiographs, and MRI findings, she has RTC tearing, and chronic stiffness in shoulder joint of which I discussed operative and non-operative treatment modalities. Patient has tried and failed all conservative treatment options including the activity modification, HEP, PT, cortisone injections and NSAIDs. \par \par Patient is considering surgical treatment . All the risks and benefits of a shoulder arthroscopy with capsular release, JORDI, DEB, possible RTC repair vs reconstruction with cuffmend patch, SAD, acromioplasty and extensive debridement were discussed with the patient. Risks include, but are not limited to, infection, bleeding, dislocation, nerve injury, blood clots and other possible medical complications, which were discussed with the patient. Also the risks of possible readmission were discussed with the patient. Patient completely understands all risks and benefits. The need for postoperative DVT prophylaxis discussed with the patient as well. The patient was given no assurances that all the symptoms will be alleviated. A packet was given to patient that describes pathophysiology, entire procedure, likely outcome, risks, and benefits, along with post operative physical therapy plan. Patient completely understands all this. She has been advised to get medical clearance before the procedure, in order to decrease complication risk.  Patient has been booked for surgery, and electing to proceed as planned. All questions were answered and the patient verbalized understanding. The patient is in agreement with this treatment plan. \par

## 2022-06-06 NOTE — PHYSICAL EXAM
[de-identified] : Physical Exam:\par General: Well appearing, no acute distress\par Neurologic: A&Ox3, No focal deficits\par Head: NCAT without abrasions, lacerations, or ecchymosis to head, face, or scalp\par Eyes: No scleral icterus, no gross abnormalities\par Respiratory: Equal chest wall expansion bilaterally, no accessory muscle use\par Lymphatic: No lymphadenopathy palpated\par Skin: Warm and dry\par Psychiatric: Normal mood and affect\par \par C-Spine\par Palpation: Tenderness to paraspinal muscles and trapezius muscle\par ROM: side bending, symmetrical. Pain with extension and flexion of the neck.\par Reflexes: C5-7 [normal]\par \par Left Shoulder\par ·	Inspection/Palpation: no tenderness, swelling or deformities; tenderness over the AC joint\par ·	Range of Motion: no crepitus with ROM; Active FF 0-90; ER at side 0-40; IR to L5 ; Passive FF 0-95 w/ resistance ; ER at side 0-45 w/ resistance ;\par -              Arc of Motion: ER to 75 degrees, IR to 40 degrees\par ·	Strength: forward elevation in scapular plane 5/5, internal rotation 4+/5, external rotation 4/5, adduction 5/5 and abduction 4/5 w/ pain  \par ·	Stability: no joint instability on provocative testing\par ·	Tests: Barrow test negative, Neer sign negative, positive drop arm test secondary to pain, bear hug test positive, Napolean sign negative, cross arm adduction positive, lift off sign positive, hornblowers sign negative, speeds test negative, Yergason's test negative, no bicipital groove tenderness, Edwards's Active Compression test negative\par \par Right Shoulder\par ·	Inspection/Palpation: no tenderness, swelling or deformities\par ·	Range of Motion: full and painless in all planes, no crepitus\par ·	Strength: forward elevation in scapular plane 5/5, internal rotation 5/5, external rotation 5/5, adduction 5/5 and abduction 5/5\par ·	Stability: no joint instability on provocative testing\par ·	Tests: Barrow test negative, Neer sign negative, negative drop arm test secondary to pain, bear hug test negative, Napolean sign negative, cross arm adduction negative, lift off sign positive, hornblowers sign negative, speeds test negative, Yergason's test negative, no bicipital groove tenderness, Edwards's Active Compression test negative [de-identified] : Procedure: MRI of Left shoulder ([facility])\par Dated: 02/18/2022\par \par Impression:\par \par The supraspinatus tendon demonstrates increasing inhomogeneity at its anterolateral attachment site on humerus representing insertional tendinosis/ tendinopathy and there is increasing accumulating of fluid in the subacromial bursa representing bursitis\par \par Shallow partial thickness bursal surface supraspinatus tendon tearing again measuring approximately 7 mm. \par \par Anterolaterally downsloping type II acromion that abuts the underlying supraspinatus \par \par Paucity of fluid in the long head of the biceps tendon sheath \par \par Focal region of cortical erosion at the anterolateral humeral head convexity. \par \par

## 2022-06-06 NOTE — HISTORY OF PRESENT ILLNESS
[Worsening] : worsening [___ yrs] : [unfilled] year(s) ago [5] : a current pain level of 5/10 [7] : an average pain level of 7/10 [Lifting] : worsened by lifting [None] : No relieving factors are noted [de-identified] : LYNN is a 50 year old female who presents today for follow up evaluation of L shoulder pain. Patient has been booked for surgery on 27 June 2022. She presents to discuss her MRI. To recap her past HPI. At last visit, Oct 2021, patient received SA cortisone injection that provided her 6-10 weeks of relief. Patient was scheduled to see us, but her insurance was no longer accepted by us. Hence, she was unable to follow up. She saw Dr. Connelly ([place]) who advised patient on surgery. She reports worsening shoulder pain at this time. She reports radiating pain down to her hand. She believes worsening ROM and strength in FF, ER and IR. She presents today with MRI from [facility]. MRI reveals: \par \par The supraspinatus tendon demonstrates increasing inhomogeneity at its anterolateral attachment site on humerus representing insertional tendinosis/ tendinopathy and there is increasing accumulating og fluid in the subacromial bursa representing bursitis\par Shallow partial thickness bursal surface supraspinatus tendon tearing again measuring approximately 7 mm. \par Anterolaterally downsloping type II acromion that abuts the underlying supraspinatus \par Paucity of fluid in the long head of the biceps tendon sheath \par Focal region of cortical erosion at the anterolateral humeral head convexity.

## 2022-06-06 NOTE — REVIEW OF SYSTEMS
[Joint Pain] : joint pain [Negative] : Heme/Lymph [Joint Stiffness] : joint stiffness [FreeTextEntry9] : left shoulder

## 2022-06-06 NOTE — ADDENDUM
[FreeTextEntry1] : Documented by Trung Cam acting as a scribe for Dr. Mckeon and Mike Lewis PA-C on 06/06/2022. \par \par All medical record entries made by the Scribe were at my, Dr. Mckeon, and Mike Lewis's, direction and\par personally dictated by me on 06/06/2022. I have reviewed the chart and agree that the record\par accurately reflects my personal performance of the history, physical exam, procedure and imaging.

## 2022-06-22 ENCOUNTER — OUTPATIENT (OUTPATIENT)
Dept: OUTPATIENT SERVICES | Facility: HOSPITAL | Age: 52
LOS: 1 days | End: 2022-06-22
Payer: MEDICAID

## 2022-06-22 VITALS
SYSTOLIC BLOOD PRESSURE: 102 MMHG | OXYGEN SATURATION: 100 % | DIASTOLIC BLOOD PRESSURE: 77 MMHG | TEMPERATURE: 98 F | HEART RATE: 89 BPM | RESPIRATION RATE: 16 BRPM | HEIGHT: 64 IN | WEIGHT: 141.1 LBS

## 2022-06-22 DIAGNOSIS — M75.122 COMPLETE ROTATOR CUFF TEAR OR RUPTURE OF LEFT SHOULDER, NOT SPECIFIED AS TRAUMATIC: ICD-10-CM

## 2022-06-22 DIAGNOSIS — Z98.890 OTHER SPECIFIED POSTPROCEDURAL STATES: Chronic | ICD-10-CM

## 2022-06-22 DIAGNOSIS — S43.102A UNSPECIFIED DISLOCATION OF LEFT ACROMIOCLAVICULAR JOINT, INITIAL ENCOUNTER: ICD-10-CM

## 2022-06-22 DIAGNOSIS — Z01.818 ENCOUNTER FOR OTHER PREPROCEDURAL EXAMINATION: ICD-10-CM

## 2022-06-22 LAB
ANION GAP SERPL CALC-SCNC: 4 MMOL/L — LOW (ref 5–17)
APTT BLD: 35.9 SEC — HIGH (ref 27.5–35.5)
BASOPHILS # BLD AUTO: 0.03 K/UL — SIGNIFICANT CHANGE UP (ref 0–0.2)
BASOPHILS NFR BLD AUTO: 0.6 % — SIGNIFICANT CHANGE UP (ref 0–2)
BUN SERPL-MCNC: 10 MG/DL — SIGNIFICANT CHANGE UP (ref 7–23)
CALCIUM SERPL-MCNC: 9.7 MG/DL — SIGNIFICANT CHANGE UP (ref 8.5–10.1)
CHLORIDE SERPL-SCNC: 109 MMOL/L — HIGH (ref 96–108)
CO2 SERPL-SCNC: 29 MMOL/L — SIGNIFICANT CHANGE UP (ref 22–31)
CREAT SERPL-MCNC: 0.7 MG/DL — SIGNIFICANT CHANGE UP (ref 0.5–1.3)
EGFR: 105 ML/MIN/1.73M2 — SIGNIFICANT CHANGE UP
EOSINOPHIL # BLD AUTO: 0.09 K/UL — SIGNIFICANT CHANGE UP (ref 0–0.5)
EOSINOPHIL NFR BLD AUTO: 1.7 % — SIGNIFICANT CHANGE UP (ref 0–6)
GLUCOSE SERPL-MCNC: 98 MG/DL — SIGNIFICANT CHANGE UP (ref 70–99)
HCT VFR BLD CALC: 41.3 % — SIGNIFICANT CHANGE UP (ref 34.5–45)
HGB BLD-MCNC: 14 G/DL — SIGNIFICANT CHANGE UP (ref 11.5–15.5)
IMM GRANULOCYTES NFR BLD AUTO: 0.2 % — SIGNIFICANT CHANGE UP (ref 0–1.5)
INR BLD: 1.12 RATIO — SIGNIFICANT CHANGE UP (ref 0.88–1.16)
LYMPHOCYTES # BLD AUTO: 2.02 K/UL — SIGNIFICANT CHANGE UP (ref 1–3.3)
LYMPHOCYTES # BLD AUTO: 38.3 % — SIGNIFICANT CHANGE UP (ref 13–44)
MCHC RBC-ENTMCNC: 30.5 PG — SIGNIFICANT CHANGE UP (ref 27–34)
MCHC RBC-ENTMCNC: 33.9 GM/DL — SIGNIFICANT CHANGE UP (ref 32–36)
MCV RBC AUTO: 90 FL — SIGNIFICANT CHANGE UP (ref 80–100)
MONOCYTES # BLD AUTO: 0.28 K/UL — SIGNIFICANT CHANGE UP (ref 0–0.9)
MONOCYTES NFR BLD AUTO: 5.3 % — SIGNIFICANT CHANGE UP (ref 2–14)
NEUTROPHILS # BLD AUTO: 2.85 K/UL — SIGNIFICANT CHANGE UP (ref 1.8–7.4)
NEUTROPHILS NFR BLD AUTO: 53.9 % — SIGNIFICANT CHANGE UP (ref 43–77)
PLATELET # BLD AUTO: 306 K/UL — SIGNIFICANT CHANGE UP (ref 150–400)
POTASSIUM SERPL-MCNC: 4.2 MMOL/L — SIGNIFICANT CHANGE UP (ref 3.5–5.3)
POTASSIUM SERPL-SCNC: 4.2 MMOL/L — SIGNIFICANT CHANGE UP (ref 3.5–5.3)
PROTHROM AB SERPL-ACNC: 13 SEC — SIGNIFICANT CHANGE UP (ref 10.5–13.4)
RBC # BLD: 4.59 M/UL — SIGNIFICANT CHANGE UP (ref 3.8–5.2)
RBC # FLD: 13.2 % — SIGNIFICANT CHANGE UP (ref 10.3–14.5)
SODIUM SERPL-SCNC: 142 MMOL/L — SIGNIFICANT CHANGE UP (ref 135–145)
WBC # BLD: 5.28 K/UL — SIGNIFICANT CHANGE UP (ref 3.8–10.5)
WBC # FLD AUTO: 5.28 K/UL — SIGNIFICANT CHANGE UP (ref 3.8–10.5)

## 2022-06-22 PROCEDURE — 36415 COLL VENOUS BLD VENIPUNCTURE: CPT

## 2022-06-22 PROCEDURE — 93005 ELECTROCARDIOGRAM TRACING: CPT

## 2022-06-22 PROCEDURE — 80048 BASIC METABOLIC PNL TOTAL CA: CPT

## 2022-06-22 PROCEDURE — 85025 COMPLETE CBC W/AUTO DIFF WBC: CPT

## 2022-06-22 PROCEDURE — 93010 ELECTROCARDIOGRAM REPORT: CPT

## 2022-06-22 PROCEDURE — G0463: CPT | Mod: 25

## 2022-06-22 PROCEDURE — 85610 PROTHROMBIN TIME: CPT

## 2022-06-22 PROCEDURE — 85730 THROMBOPLASTIN TIME PARTIAL: CPT
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
## 2022-06-22 NOTE — H&P PST ADULT - HISTORY OF PRESENT ILLNESS
51 y.o  51 y.o WD, WN female presents to PST with hx of left shoulder pain. Patient reports left shoulder pain over the past 1 1/2 yrs. She has followed with ortho and treated her pain conservatively , physical therapy, injections with no long term relief. Patient states pain has worsened with limited ROM. She is now scheduled for Left Shoulder Arthroscopy Capsule Release Manipulation Under Anesthesia, Lysis of Adhesions, possible Rotator Cuff Repair verse Reconstruction with Cuffmend Patch

## 2022-06-22 NOTE — H&P PST ADULT - ACTIVITY
Treadmill 3x a week , low incline. Patient states dyspnea walking up a flight of stairs, has followed cardiology , no significant findings

## 2022-06-22 NOTE — H&P PST ADULT - NSICDXFAMILYHX_GEN_ALL_CORE_FT
FAMILY HISTORY:  Grandparent  Still living? Unknown  CAD (coronary artery disease), Age at diagnosis: 61-70    Uncle  Still living? Yes, Estimated age: Age Unknown  CAD (coronary artery disease), Age at diagnosis: 51-60

## 2022-06-22 NOTE — H&P PST ADULT - NSICDXPASTSURGICALHX_GEN_ALL_CORE_FT
PAST SURGICAL HISTORY:  History of back surgery Lumbar laminectomy -2007    History of esophagogastroduodenoscopy (EGD) 12/2021

## 2022-06-22 NOTE — H&P PST ADULT - ASSESSMENT
51 y.o female scheduled for  51 y.o female scheduled for  Left Shoulder Arthroscopy Capsule Release Manipulation Under Anesthesia, Lysis of Adhesions, possible Rotator Cuff Repair verse Reconstruction with Cuffmend Patch   Plan  1. Stop all NSAIDS, herbal supplements and vitamins for 7 days.  2. NPO at midnight.  3. Take the following medications Omeprazole  with small sips of water on the morning of your procedure/surgery.  4. Use EZ sponges as directed  5. Labs, EKG as per surgeon  6. PMD P. Ng visit for optimization prior to surgery as per surgeon  7. COVID swab appt: 6/24/2022  8. UCG STAT on admit      51 y.o female scheduled for  Left Shoulder Arthroscopy Capsule Release Manipulation Under Anesthesia, Lysis of Adhesions, possible Rotator Cuff Repair verse Reconstruction with Cuffmend Patch   Plan  1. Stop all NSAIDS, herbal supplements and vitamins for 7 days.  2. NPO at midnight.  3. Take the following medications Omeprazole  with small sips of water on the morning of your procedure/surgery.  4. Use EZ sponges as directed  5. Labs, EKG as per surgeon  6. PMD P. Ng visit for optimization prior to surgery as per surgeon  7. Obtain copy of last echocardiogram   8  COVID swab appt: 6/24/2022    8. UCG STAT on admit

## 2022-06-22 NOTE — H&P PST ADULT - ATTENDING COMMENTS
Orthopedic Sports Attending:    Agree with above resident/PA note.  Note edited where necessary.      Patient seen and examined at bedside. Discussed the risks, complications, benefits and alternatives of care. Confirmed procedure and site. Patient fully understands and would like to proceed with surgery.    Blu Mckeon, DO  Orthopaedic Surgery

## 2022-06-23 DIAGNOSIS — Z01.818 ENCOUNTER FOR OTHER PREPROCEDURAL EXAMINATION: ICD-10-CM

## 2022-06-23 DIAGNOSIS — S43.102A UNSPECIFIED DISLOCATION OF LEFT ACROMIOCLAVICULAR JOINT, INITIAL ENCOUNTER: ICD-10-CM

## 2022-06-26 LAB — SARS-COV-2 N GENE NPH QL NAA+PROBE: NOT DETECTED

## 2022-06-27 ENCOUNTER — TRANSCRIPTION ENCOUNTER (OUTPATIENT)
Age: 52
End: 2022-06-27

## 2022-06-27 ENCOUNTER — APPOINTMENT (OUTPATIENT)
Dept: ORTHOPEDIC SURGERY | Facility: HOSPITAL | Age: 52
End: 2022-06-27

## 2022-06-27 ENCOUNTER — RESULT REVIEW (OUTPATIENT)
Age: 52
End: 2022-06-27

## 2022-06-27 ENCOUNTER — OUTPATIENT (OUTPATIENT)
Dept: INPATIENT UNIT | Facility: HOSPITAL | Age: 52
LOS: 1 days | Discharge: HOME | End: 2022-06-27
Payer: MEDICAID

## 2022-06-27 VITALS
TEMPERATURE: 98 F | SYSTOLIC BLOOD PRESSURE: 119 MMHG | DIASTOLIC BLOOD PRESSURE: 66 MMHG | RESPIRATION RATE: 16 BRPM | OXYGEN SATURATION: 100 % | HEIGHT: 64 IN | WEIGHT: 141.1 LBS | HEART RATE: 69 BPM

## 2022-06-27 VITALS
TEMPERATURE: 98 F | RESPIRATION RATE: 16 BRPM | OXYGEN SATURATION: 99 % | HEART RATE: 84 BPM | SYSTOLIC BLOOD PRESSURE: 123 MMHG | DIASTOLIC BLOOD PRESSURE: 66 MMHG

## 2022-06-27 DIAGNOSIS — S43.102A UNSPECIFIED DISLOCATION OF LEFT ACROMIOCLAVICULAR JOINT, INITIAL ENCOUNTER: ICD-10-CM

## 2022-06-27 DIAGNOSIS — M75.02 ADHESIVE CAPSULITIS OF LEFT SHOULDER: ICD-10-CM

## 2022-06-27 DIAGNOSIS — Z98.890 OTHER SPECIFIED POSTPROCEDURAL STATES: Chronic | ICD-10-CM

## 2022-06-27 LAB — HCG UR QL: NEGATIVE — SIGNIFICANT CHANGE UP

## 2022-06-27 PROCEDURE — 88304 TISSUE EXAM BY PATHOLOGIST: CPT | Mod: 26

## 2022-06-27 PROCEDURE — 29826 SHO ARTHRS SRG DECOMPRESSION: CPT | Mod: LT

## 2022-06-27 PROCEDURE — 88304 TISSUE EXAM BY PATHOLOGIST: CPT

## 2022-06-27 PROCEDURE — 29827 SHO ARTHRS SRG RT8TR CUF RPR: CPT | Mod: LT

## 2022-06-27 PROCEDURE — 81025 URINE PREGNANCY TEST: CPT

## 2022-06-27 RX ORDER — ONDANSETRON 8 MG/1
1 TABLET, FILM COATED ORAL
Qty: 28 | Refills: 0
Start: 2022-06-27 | End: 2022-07-03

## 2022-06-27 RX ORDER — METOPROLOL TARTRATE 50 MG
1 TABLET ORAL
Qty: 0 | Refills: 0 | DISCHARGE

## 2022-06-27 RX ORDER — ACETAMINOPHEN 500 MG
2 TABLET ORAL
Qty: 0 | Refills: 0 | DISCHARGE

## 2022-06-27 RX ORDER — OMEPRAZOLE 10 MG/1
1 CAPSULE, DELAYED RELEASE ORAL
Qty: 0 | Refills: 0 | DISCHARGE

## 2022-06-27 RX ORDER — OXYCODONE HYDROCHLORIDE 5 MG/1
5 TABLET ORAL ONCE
Refills: 0 | Status: DISCONTINUED | OUTPATIENT
Start: 2022-06-27 | End: 2022-06-27

## 2022-06-27 RX ORDER — OMEGA-3 ACID ETHYL ESTERS 1 G
0 CAPSULE ORAL
Qty: 0 | Refills: 0 | DISCHARGE

## 2022-06-27 RX ORDER — PROCHLORPERAZINE MALEATE 5 MG
10 TABLET ORAL ONCE
Refills: 0 | Status: COMPLETED | OUTPATIENT
Start: 2022-06-27 | End: 2022-06-27

## 2022-06-27 RX ORDER — DOCUSATE SODIUM 100 MG
1 CAPSULE ORAL
Qty: 21 | Refills: 0
Start: 2022-06-27 | End: 2022-07-03

## 2022-06-27 RX ORDER — OXYCODONE HYDROCHLORIDE 5 MG/1
1 TABLET ORAL
Qty: 32 | Refills: 0
Start: 2022-06-27 | End: 2022-07-04

## 2022-06-27 RX ORDER — SODIUM CHLORIDE 9 MG/ML
1000 INJECTION, SOLUTION INTRAVENOUS
Refills: 0 | Status: DISCONTINUED | OUTPATIENT
Start: 2022-06-27 | End: 2022-06-27

## 2022-06-27 RX ORDER — FENTANYL CITRATE 50 UG/ML
50 INJECTION INTRAVENOUS
Refills: 0 | Status: DISCONTINUED | OUTPATIENT
Start: 2022-06-27 | End: 2022-06-27

## 2022-06-27 RX ORDER — GABAPENTIN 400 MG/1
1 CAPSULE ORAL
Qty: 0 | Refills: 0 | DISCHARGE

## 2022-06-27 RX ORDER — METHOTREXATE 2.5 MG/1
8 TABLET ORAL
Qty: 0 | Refills: 0 | DISCHARGE

## 2022-06-27 RX ORDER — ONDANSETRON 4 MG/1
4 TABLET ORAL
Qty: 42 | Refills: 0 | Status: COMPLETED | COMMUNITY
Start: 2022-06-27 | End: 2022-07-04

## 2022-06-27 RX ORDER — FOLIC ACID 0.8 MG
1 TABLET ORAL
Qty: 0 | Refills: 0 | DISCHARGE

## 2022-06-27 RX ORDER — ONDANSETRON 8 MG/1
4 TABLET, FILM COATED ORAL ONCE
Refills: 0 | Status: COMPLETED | OUTPATIENT
Start: 2022-06-27 | End: 2022-06-27

## 2022-06-27 RX ORDER — CYCLOBENZAPRINE HYDROCHLORIDE 10 MG/1
5 TABLET, FILM COATED ORAL
Qty: 0 | Refills: 0 | DISCHARGE

## 2022-06-27 RX ADMIN — Medication 10 MILLIGRAM(S): at 18:30

## 2022-06-27 RX ADMIN — ONDANSETRON 4 MILLIGRAM(S): 8 TABLET, FILM COATED ORAL at 18:08
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
## 2022-06-27 NOTE — ASU DISCHARGE PLAN (ADULT/PEDIATRIC) - NS MD DC FALL RISK RISK
For information on Fall & Injury Prevention, visit: [email or web address] OR  [email or web address] OR  https://www.cdc.gov/steadi/patient.html

## 2022-06-27 NOTE — ASU DISCHARGE PLAN (ADULT/PEDIATRIC) - ASU DC SPECIAL INSTRUCTIONSFT
POST-OPERATIVE INSTRUCTION SHEET: ROTATOR CUFF REPAIR       MEDICATIONS: You will be given a prescription for pain medication prior to discharge. This medication may be taken as directed for breakthrough pain. You should try taking Extra Strength Tylenol first (500 mg every 4 hours) which is available over-the-counter. Be sure not to exceed 3,000 mg in 24 hours. We ask that you avoid NSAIDs (Advil, Motrin, Aleve, ibuprofen, etc.) for the first few weeks after your surgery as these may interrupt tendon healing.     You should take a stool softener while you are taking narcotics. The pain medication can cause significant constipation. Leanna-Colace can be purchased over the counter and is taken twice daily.     ICE: An ice device or an ice bag (not directly touching the skin) should be utilized to reduce swelling and pain. Please ice every 3-4 hours for about 15-20 minutes each time for at least the first 5 days or until swelling subsides. We have Polar Ice devices in our office available for purchase and our staff would be happy to assist you with this. Although this is helpful, it is not mandatory and available to you only if you would like one.      SLING: You will be given a sling with an abduction pillow. This should be worn for at least 4-6 weeks unless directed by Dr. Mckeon or Pilar. The sling may be removed for hygiene and for exercises. You should sleep with the sling on.     EXERCISES: Pendulum exercises to be performed for 3-5 minutes every 1-2 hours. When lying in bed, elevate the head of your bed. Move your fingers, hand and elbow to increase circulation. DO NOT lift your arm at the shoulder as to avoid destroying the repair, until it has healed (when Cleared by Dr. Mckeon)    PHYSICAL THERAPY: You will begin PT usually 2 weeks after surgery and a PT prescription and protocol will be given to you at your first post-operative appointment. Please plan to begin PT within a week of this appointment. You will schedule this on your own but we can assist you in finding a convenient facility.      WOUND CARE:  Leave your surgical dressing on for 3 days. After 3 days you may remove your dressing and shower. Dry the incisions well and apply a small dressing or Band-Aid over the incisions. Keeping these areas dry and clean is very important.     FOLLOW UP: If you do not already have a follow-up visit scheduled, please call [phone] to schedule one with Dr. Mckeon or Pilar within 7-10 days for suture removal and to receive the PT prescription and protocol.

## 2022-06-27 NOTE — ASU PREOP CHECKLIST - ADVANCE DIRECTIVE ADDRESSED/READDRESSED
[FreeTextEntry1] : [facility]/p multiple I&D with graft placements secondary to nec fasc infection. Pt had multiple I&D's with stravix application and VAC. At [facility] in February, pt also had STSG on 5/21 w/ Dr. Cabello. Pt had a PICC line placed in July for concern for osteomyelitis of right foot. Pt has now been closed for about 3 months, and has been using the bone stimulator twice a day, recently stopped for a few days. Pt says last week he noticed 2 areas on the foot where pus was coming out, Dr. Westbrook prescribed Bactrim and Mupirocin. Pt also complaining of an itchy red rash on the foot and lower leg that started 2 months ago, has been using ciclopirox without improvement, says its nowhere else on his body. Pt states that his foot sweats a lot in the CROW boot, which may be the cause of the rash. Bout of folliculitis last month which has now resolved. Denies any recent N/V/F/C/SOB.
done

## 2022-06-27 NOTE — ASU DISCHARGE PLAN (ADULT/PEDIATRIC) - CARE PROVIDER_API CALL
Blu Mckeon (DO)  [address], [address]  [place] [address]  Phone: [phone]  Fax: [phone]  Follow Up Time:

## 2022-06-30 DIAGNOSIS — M24.112 OTHER ARTICULAR CARTILAGE DISORDERS, LEFT SHOULDER: ICD-10-CM

## 2022-06-30 DIAGNOSIS — M75.122 COMPLETE ROTATOR CUFF TEAR OR RUPTURE OF LEFT SHOULDER, NOT SPECIFIED AS TRAUMATIC: ICD-10-CM

## 2022-06-30 DIAGNOSIS — K21.9 GASTRO-ESOPHAGEAL REFLUX DISEASE WITHOUT ESOPHAGITIS: ICD-10-CM

## 2022-06-30 DIAGNOSIS — M75.02 ADHESIVE CAPSULITIS OF LEFT SHOULDER: ICD-10-CM

## 2022-06-30 DIAGNOSIS — M06.9 RHEUMATOID ARTHRITIS, UNSPECIFIED: ICD-10-CM

## 2022-06-30 DIAGNOSIS — M75.42 IMPINGEMENT SYNDROME OF LEFT SHOULDER: ICD-10-CM

## 2022-06-30 DIAGNOSIS — F41.9 ANXIETY DISORDER, UNSPECIFIED: ICD-10-CM

## 2022-07-11 ENCOUNTER — APPOINTMENT (OUTPATIENT)
Dept: ORTHOPEDIC SURGERY | Facility: CLINIC | Age: 52
End: 2022-07-11

## 2022-07-11 VITALS
BODY MASS INDEX: 25.1 KG/M2 | OXYGEN SATURATION: 99 % | SYSTOLIC BLOOD PRESSURE: 106 MMHG | DIASTOLIC BLOOD PRESSURE: 69 MMHG | HEART RATE: 79 BPM | WEIGHT: 147 LBS | HEIGHT: 64 IN

## 2022-07-11 PROCEDURE — 99024 POSTOP FOLLOW-UP VISIT: CPT

## 2022-07-11 PROCEDURE — 73030 X-RAY EXAM OF SHOULDER: CPT | Mod: LT

## 2022-07-11 NOTE — HISTORY OF PRESENT ILLNESS
[___ Days Post Op] : post op day #[unfilled] [3] : the patient reports pain that is 3/10 in severity [Clean/Dry/Intact] : clean, dry and intact [Neuro Intact] : an unremarkable neurological exam [Vascular Intact] : ~T peripheral vascular exam normal [Xray (Date:___)] : [unfilled] Xray -  [Doing Well] : is doing well [Excellent Pain Control] : has excellent pain control [Chills] : no chills [Fever] : no fever [Nausea] : no nausea [Vomiting] : no vomiting [Discharge] : absent of discharge [Dehiscence] : not dehisced [de-identified] : SPO Left (L) shoulder arthroscopic RTC repair, DEB, JORDI, sad, acromioplasty. DOS: 6/27/22 [de-identified] : LYNN HORTA is a 52 year y/o female who presents for SPO Left (L) shoulder arthroscopic RTC repair, DEB, JORDI, sad, acromioplasty. DOS: 6/27/22, 14 days ago. She denies fever or chills, redness around or near the incision site(s), numbness/tingling. She denies nausea/ vomiting and admits to their appetite since their surgery being back to normal. Normal bowel habits at this time. Patient has started physical therapy. Patient presents today wearing brace.  [de-identified] : Left (L) Shoulder\par \par Incisions are clean, dry and intact. No surrounding erythema. No drainage. Wound appears to be healing well. Passive FF 0- 30.  Motor and sensation are intact distally. He has full range of motion of all fingers with capillary refill of <2 seconds throughout. She has good nerve function and median nerve, ulnar, radial, PIN, AIN. She has no sensory deficits over the C5-T1 nerve roots. Radial pulses 2+. Able to make an A-OK sign and thumbs up sign: flex/extend thumb, cross middle finger over index.\par \par Full ROM Wrist/ Elbow  [de-identified] : 2 view xrays of pt Left (L) shoulder were performed today and available for me to review. They demonstrate adequate position of suture buttons to bone. No fracture. No dislocation. No other deformities. Humeral head not high riding, adequate position of humeral head in glenoid.  [de-identified] : LYNN is a 52 year female who is doing extremely well and is without complaints. She presents today in her sling with pillow.   Surgical sites are clean and dry without warmth or erythema, pt denies fever or chills.  She is to remain in the sling until we see her again at her next post-op malcom and continue with being fully nonweightbearing to this extremity. She will continue PT 2-3x/week alongside HEP until we see them again in 4 weeks for clinical reassessment. She may use tylenol or ibuprofen prn for pain. She agrees to the latter plan and does not have any further questions or concerns.

## 2022-07-11 NOTE — ADDENDUM
[FreeTextEntry1] : Documented by Trung Cam acting as a scribe for Dr. Mckeon and Mike Lewis PA-C on 07/11/2022. \par \par All medical record entries made by the Scribe were at my, Dr. Mckeon, and Mike Lewis's, direction and\par personally dictated by me on 07/11/2022. I have reviewed the chart and agree that the record\par accurately reflects my personal performance of the history, physical exam, procedure and imaging.

## 2022-08-08 ENCOUNTER — APPOINTMENT (OUTPATIENT)
Dept: ORTHOPEDIC SURGERY | Facility: CLINIC | Age: 52
End: 2022-08-08

## 2022-08-08 PROBLEM — M25.512 PAIN IN LEFT SHOULDER: Chronic | Status: ACTIVE | Noted: 2022-06-22

## 2022-08-08 PROBLEM — K58.9 IRRITABLE BOWEL SYNDROME WITHOUT DIARRHEA: Chronic | Status: ACTIVE | Noted: 2022-06-22

## 2022-08-08 PROBLEM — I51.81 TAKOTSUBO SYNDROME: Chronic | Status: ACTIVE | Noted: 2022-06-22

## 2022-08-08 PROBLEM — K21.9 GASTRO-ESOPHAGEAL REFLUX DISEASE WITHOUT ESOPHAGITIS: Chronic | Status: ACTIVE | Noted: 2022-06-22
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
## 2022-08-08 PROBLEM — M19.90 UNSPECIFIED OSTEOARTHRITIS, UNSPECIFIED SITE: Chronic | Status: ACTIVE | Noted: 2017-06-27

## 2022-08-08 PROCEDURE — 99024 POSTOP FOLLOW-UP VISIT: CPT

## 2022-08-08 NOTE — HISTORY OF PRESENT ILLNESS
[___ Weeks Post Op] : [unfilled] weeks post op [8] : the patient reports pain that is 8/10 in severity [Clean/Dry/Intact] : clean, dry and intact [Neuro Intact] : an unremarkable neurological exam [Vascular Intact] : ~T peripheral vascular exam normal [Doing Well] : is doing well [Chills] : no chills [Fever] : no fever [Nausea] : no nausea [Vomiting] : no vomiting [Discharge] : absent of discharge [Dehiscence] : not dehisced [de-identified] : SPO Left (L) shoulder arthroscopic RTC repair, DEB, JORDI, sad, acromioplasty. DOS: 6/27/22 [de-identified] : LYNN HORTA is a 52 year y/o female who presents for SPO Left (L) shoulder arthroscopic RTC repair, DEB, JORDI, sad, acromioplasty, 6 weeks. She denies fever or chills, redness around or near the incision site(s), numbness/tingling. She presents today noting significant constant shoulder pain. She was taking Tylenol without relief. Patient was also taking gabapentin and reports it helped, but she ran out. [de-identified] : Left (L) Shoulder\par \par Incisions are clean, dry and intact. No surrounding erythema. No drainage. Wound appears to be healing well. Passive FF 0- 30.  Motor and sensation are intact distally. She has full range of motion of all fingers with capillary refill of <2 seconds throughout. She has good nerve function and median nerve, ulnar, radial, PIN, AIN. She has no sensory deficits over the C5-T1 nerve roots. Radial pulses 2+. Able to make an A-OK sign and thumbs up sign: flex/extend thumb, cross middle finger over index. Tender lateral and posterior lateral\par \par Full ROM Wrist/ Elbow  [de-identified] : No new imaging performed today.  [de-identified] : LYNN is a 52 year female who is doing well. Surgical sites are clean and dry without warmth or erythema, pt denies fever or chills. A prescription of Meloxicam was given to be taken as directed with food to prevent GI upset, if occurs pt to D/C and call us at that time.  I also ordered gabapentin today. I advised the patient to go for massage therapy as well. She agrees to the latter plan and does not have any further questions or concerns. Patient will follow up in 6 wks for repeat clinical assessment. If pain does not subside, call office in 1 week.

## 2022-09-06 ENCOUNTER — APPOINTMENT (OUTPATIENT)
Dept: ORTHOPEDIC SURGERY | Facility: CLINIC | Age: 52
End: 2022-09-06

## 2022-09-12 ENCOUNTER — APPOINTMENT (OUTPATIENT)
Dept: ORTHOPEDIC SURGERY | Facility: CLINIC | Age: 52
End: 2022-09-12

## 2022-09-12 ENCOUNTER — RX RENEWAL (OUTPATIENT)
Age: 52
End: 2022-09-12

## 2022-09-12 PROCEDURE — 99024 POSTOP FOLLOW-UP VISIT: CPT

## 2022-09-12 NOTE — HISTORY OF PRESENT ILLNESS
[___ Months Post Op] : [unfilled] months post op [Clean/Dry/Intact] : clean, dry and intact [Neuro Intact] : an unremarkable neurological exam [Vascular Intact] : ~T peripheral vascular exam normal [Doing Well] : is doing well [3] : the patient reports pain that is 3/10 in severity [Chills] : no chills [Fever] : no fever [Nausea] : no nausea [Vomiting] : no vomiting [Discharge] : absent of discharge [Dehiscence] : not dehisced [de-identified] : SPO Left (L) shoulder arthroscopic RTC repair, DEB, JORDI, sad, acromioplasty. DOS: 6/27/22 [de-identified] : LYNN HORTA is a 52 year y/o female who presents for SPO Left (L) shoulder arthroscopic RTC repair, DEB, JORDI, sera, acromioplasty, 2.5 months. She denies fever or chills, redness around or near the incision site(s), numbness/tingling.She has not gone to physical therapy in 3 weeks and reports increased stiffness. She states her ROM is limited.  [de-identified] : Left (L) Shoulder\par \par Incisions are clean, dry and intact. No surrounding erythema. No drainage. Wound appears to be healing well. Passive FF 0- 30.  Motor and sensation are intact distally. She has full range of motion of all fingers with capillary refill of <2 seconds throughout. She has good nerve function and median nerve, ulnar, radial, PIN, AIN. She has no sensory deficits over the C5-T1 nerve roots. Radial pulses 2+. Able to make an A-OK sign and thumbs up sign: flex/extend thumb, cross middle finger over index. \par \par Full ROM Wrist/ Elbow  [de-identified] : Procedure: MRI of L shoulder - STAND UP\par Dated: 09/03/2022\par Impression:\par \par * Postop change of the AC joint with marrow edema, muscular edema, and no\par deltoid dehiscence and no fracture.\par \par * No rotator cuff tear.\par \par * Fraying of the superior labrum. Indirect MR arthrogram could be obtained to\par evaluate for tear if warranted.\par   [de-identified] : LYNN is a 52 year female who is doing well. Surgical sites are clean and dry without warmth or erythema, pt denies fever or chills. Patient was given prescription of formal physical therapy that she will perform 2x/wk for 6-8 wks. Her gabapentin was reordered today. She agrees to the latter plan and does not have any further questions or concerns. Patient will follow up in 2 months for repeat clinical assessment.

## 2022-09-12 NOTE — ADDENDUM
[FreeTextEntry1] : Documented by Imani Alford acting as a scribe for Dr. Mckeon and Mike Lewis PA-C on 09/12/2022. \par \par All medical record entries made by the Scribe were at my, Dr. Mckeon, and Mike Lewis's, direction and\par personally dictated by me on 09/12/2022. I have reviewed the chart and agree that the record\par accurately reflects my personal performance of the history, physical exam, procedure and imaging.

## 2022-09-21 ENCOUNTER — APPOINTMENT (OUTPATIENT)
Dept: ORTHOPEDIC SURGERY | Facility: CLINIC | Age: 52
End: 2022-09-21

## 2022-10-04 ENCOUNTER — RX RENEWAL (OUTPATIENT)
Age: 52
End: 2022-10-04

## 2022-10-25 ENCOUNTER — RX RENEWAL (OUTPATIENT)
Age: 52
End: 2022-10-25

## 2022-10-25 ENCOUNTER — APPOINTMENT (OUTPATIENT)
Dept: CARDIOLOGY | Facility: CLINIC | Age: 52
End: 2022-10-25

## 2022-10-25 ENCOUNTER — NON-APPOINTMENT (OUTPATIENT)
Age: 52
End: 2022-10-25

## 2022-10-25 VITALS
BODY MASS INDEX: 25.1 KG/M2 | HEIGHT: 64 IN | OXYGEN SATURATION: 99 % | SYSTOLIC BLOOD PRESSURE: 92 MMHG | HEART RATE: 75 BPM | DIASTOLIC BLOOD PRESSURE: 61 MMHG | WEIGHT: 147 LBS

## 2022-10-25 DIAGNOSIS — I21.4 NON-ST ELEVATION (NSTEMI) MYOCARDIAL INFARCTION: ICD-10-CM

## 2022-10-25 PROCEDURE — 99213 OFFICE O/P EST LOW 20 MIN: CPT | Mod: 25

## 2022-10-25 PROCEDURE — 93000 ELECTROCARDIOGRAM COMPLETE: CPT

## 2022-10-25 RX ORDER — MELOXICAM 15 MG/1
15 TABLET ORAL
Qty: 21 | Refills: 2 | Status: DISCONTINUED | COMMUNITY
Start: 2021-05-17 | End: 2022-10-25

## 2022-10-25 RX ORDER — TRAMADOL HYDROCHLORIDE 50 MG/1
50 TABLET, COATED ORAL
Qty: 30 | Refills: 0 | Status: DISCONTINUED | COMMUNITY
Start: 2022-07-06 | End: 2022-10-25

## 2022-10-25 RX ORDER — PANTOPRAZOLE 40 MG/1
40 TABLET, DELAYED RELEASE ORAL
Qty: 30 | Refills: 1 | Status: DISCONTINUED | COMMUNITY
Start: 2021-06-29 | End: 2022-10-25

## 2022-10-25 RX ORDER — VENLAFAXINE HYDROCHLORIDE 37.5 MG/1
37.5 CAPSULE, EXTENDED RELEASE ORAL
Qty: 90 | Refills: 1 | Status: DISCONTINUED | COMMUNITY
Start: 2021-09-13 | End: 2022-10-25

## 2022-10-25 RX ORDER — OXYCODONE 5 MG/1
5 TABLET ORAL
Qty: 30 | Refills: 0 | Status: DISCONTINUED | COMMUNITY
Start: 2022-06-27 | End: 2022-10-25

## 2022-10-25 NOTE — DISCUSSION/SUMMARY
[FreeTextEntry1] : Patient with prior NSTEMI in the distant past.  Now complains of fairly typical anginal symptoms.  Stress echo ordered. [EKG obtained to assist in diagnosis and management of assessed problem(s)] : EKG obtained to assist in diagnosis and management of assessed problem(s)

## 2022-10-25 NOTE — HISTORY OF PRESENT ILLNESS
[FreeTextEntry1] : 52 year old woman with a history of NSTEMI, probably stress induced from 5 years ago.  She has noted chest heaviness with exertion and relieved by rest.  She is able to continue to do her normal activities.  She denies dyspnea on exertion.

## 2022-10-30 ENCOUNTER — NON-APPOINTMENT (OUTPATIENT)
Age: 52
End: 2022-10-30

## 2022-10-31 ENCOUNTER — APPOINTMENT (OUTPATIENT)
Dept: ORTHOPEDIC SURGERY | Facility: CLINIC | Age: 52
End: 2022-10-31

## 2022-10-31 ENCOUNTER — RX RENEWAL (OUTPATIENT)
Age: 52
End: 2022-10-31

## 2022-11-14 ENCOUNTER — RX RENEWAL (OUTPATIENT)
Age: 52
End: 2022-11-14

## 2022-11-14 ENCOUNTER — APPOINTMENT (OUTPATIENT)
Dept: ORTHOPEDIC SURGERY | Facility: CLINIC | Age: 52
End: 2022-11-14

## 2022-11-14 DIAGNOSIS — M25.512 PAIN IN LEFT SHOULDER: ICD-10-CM

## 2022-11-14 PROCEDURE — 99214 OFFICE O/P EST MOD 30 MIN: CPT

## 2022-11-18 PROBLEM — M25.512 ACUTE PAIN OF LEFT SHOULDER: Status: ACTIVE | Noted: 2021-02-09

## 2022-11-18 NOTE — PHYSICAL EXAM
[de-identified] : Physical Exam: \par General: Well appearing, no acute distress \par Neurologic: A&Ox3, No focal deficits \par Head: NCAT without abrasions, lacerations, or ecchymosis to head, face, or scalp \par Eyes: No scleral icterus, no gross abnormalities \par Respiratory: Equal chest wall expansion bilaterally, no accessory muscle use \par Lymphatic: No lymphadenopathy palpated \par Skin: Warm and dry \par Psychiatric: Normal mood and affect \par \par Physical Exam: \par General: Well appearing, no acute distress \par Neurologic: A&Ox3, No focal deficits \par Head: NCAT without abrasions, lacerations, or ecchymosis to head, face, or scalp \par Eyes: No scleral icterus, no gross abnormalities \par Respiratory: Equal chest wall expansion bilaterally, no accessory muscle use \par Lymphatic: No lymphadenopathy palpated \par Skin: Warm and dry Psychiatric: Normal mood and affect  \par \par Left Shoulder · \par \par Inspection/Palpation: no tenderness, swelling or deformities · \par Range of Motion: no crepitus with ROM; Active FF 0 - 90 ; Passive FF 0 - 90 ; ER at side 0 - 35; IR to L5  · \par Strength: deferred due to nature of surgery \par Stability: no joint instability on provocative testing · \par \par \par right Shoulder · \par \par Inspection/Palpation: no tenderness, swelling or deformities · \par Range of Motion: full and painless in all planes, no crepitus · \par Stability: no joint instability on provocative testing · [de-identified] : No new imaging done today.

## 2022-11-18 NOTE — DISCUSSION/SUMMARY
[de-identified] : LYNN is a 52 year female here today for followup 5.5 months SPO Left (L) shoulder arthroscopic RTC repair, DEB, JORDI, sad, acromioplasty. DOS: 6/27/22. Patient denies any recent fevers, chills or night sweats. Patient denies any radiating pain, numbness, tingling sensations, burning sensations, urinary or bowel incontinence. \par \par We had a thorough discussion regarding the nature of her pain, the pathophysiology, as well as all treatment options. Based on clinical exam findings and given her lack of ROM she could benefit from possible cortisone injections and/or revision surgery. All questions were answered and the patient verbalized understanding. The patient is in agreement with this treatment plan.

## 2022-11-18 NOTE — REASON FOR VISIT
[Follow-Up Visit] : a follow-up visit for [FreeTextEntry2] : SPO Left (L) shoulder arthroscopic RTC repair, DEB, JORDI, sad, acromioplasty. DOS: 6/27/22.

## 2022-11-18 NOTE — HISTORY OF PRESENT ILLNESS
[Stable] : stable [de-identified] : LYNN is a 52 year female here today for followup 5.5 months SPO Left (L) shoulder arthroscopic RTC repair, DEB, JORDI, sad, acromioplasty. DOS: 6/27/22. Patient denies any recent fevers, chills or night sweats. Patient denies any radiating pain, numbness, tingling sensations, burning sensations, urinary or bowel incontinence. \par

## 2022-11-18 NOTE — REVIEW OF SYSTEMS
[Negative] : Heme/Lymph [FreeTextEntry9] : SPO Left (L) shoulder arthroscopic RTC repair, DEB, JORDI, sad, acromioplasty. DOS: 6/27/22.

## 2022-11-18 NOTE — ADDENDUM
[FreeTextEntry1] : Documented by Becky Shaffer acting as a scribe for Dr. Mckeon and Mike Lewis PA-C on 11/14/2022.   All medical record entries made by the Scribe were at my, Dr. Mckeon, and Mike Lewis's, direction and personally dictated by me on 11/14/2022. I have reviewed the chart and agree that the record accurately reflects my personal performance of the history, physical exam, procedure and imaging.

## 2022-11-22 DIAGNOSIS — I21.9 ACUTE MYOCARDIAL INFARCTION, UNSPECIFIED: ICD-10-CM

## 2022-11-23 ENCOUNTER — APPOINTMENT (OUTPATIENT)
Dept: CARDIOLOGY | Facility: CLINIC | Age: 52
End: 2022-11-23

## 2022-11-30 ENCOUNTER — APPOINTMENT (OUTPATIENT)
Dept: CARDIOLOGY | Facility: CLINIC | Age: 52
End: 2022-11-30

## 2022-12-28 ENCOUNTER — APPOINTMENT (OUTPATIENT)
Dept: CT IMAGING | Facility: CLINIC | Age: 52
End: 2022-12-28

## 2023-01-09 ENCOUNTER — APPOINTMENT (OUTPATIENT)
Dept: ORTHOPEDIC SURGERY | Facility: CLINIC | Age: 53
End: 2023-01-09
Payer: MEDICAID

## 2023-01-09 VITALS — HEIGHT: 64 IN | BODY MASS INDEX: 25.1 KG/M2 | WEIGHT: 147 LBS

## 2023-01-09 DIAGNOSIS — M75.02 ADHESIVE CAPSULITIS OF LEFT SHOULDER: ICD-10-CM

## 2023-01-09 PROCEDURE — 99214 OFFICE O/P EST MOD 30 MIN: CPT | Mod: 25

## 2023-01-09 PROCEDURE — 20611 DRAIN/INJ JOINT/BURSA W/US: CPT | Mod: LT

## 2023-01-09 NOTE — PROCEDURE
[de-identified] : Injection: US guided Left shoulder (Subacromial).    \par \par A discussion was had with the patient regarding this procedure and all questions were answered. All risks, benefits and alternatives were discussed. These included but were not limited to bleeding, infection, and allergic reaction. Alcohol was used to clean the skin, and ChloraPrep was used to sterilize and prep the area in the posterior aspect of the left shoulder. Ethyl chloride spray was then used as a topical anesthetic. A 22-gauge needle was used to inject 3cc 1% xylocaine, 2cc 0.25% bupivacaine and 1cc of 40mg/mL triamcinolone acetonide split evenly into the left subacromial and glenohumeral joint space. Ultrasound guidance was used for localization. A sterile band aid was then applied. The patient tolerated the procedure well and there were no complications. Post injection instructions were given.

## 2023-01-09 NOTE — PHYSICAL EXAM
[de-identified] : Physical Exam: \par General: Well appearing, no acute distress \par Neurologic: A&Ox3, No focal deficits \par Head: NCAT without abrasions, lacerations, or ecchymosis to head, face, or scalp \par Eyes: No scleral icterus, no gross abnormalities \par Respiratory: Equal chest wall expansion bilaterally, no accessory muscle use \par Lymphatic: No lymphadenopathy palpated \par Skin: Warm and dry \par Psychiatric: Normal mood and affect \par \par Physical Exam: \par General: Well appearing, no acute distress \par Neurologic: A&Ox3, No focal deficits \par Head: NCAT without abrasions, lacerations, or ecchymosis to head, face, or scalp \par Eyes: No scleral icterus, no gross abnormalities \par Respiratory: Equal chest wall expansion bilaterally, no accessory muscle use \par Lymphatic: No lymphadenopathy palpated \par Skin: Warm and dry Psychiatric: Normal mood and affect  \par \par Left Shoulder · \par \par Inspection/Palpation: no tenderness, swelling or deformities · \par Range of Motion: no crepitus with ROM; Active FF 0 - 90 ; Passive FF 0 - 90 ; ER at side 0 - 25; IR to L5  · \par Strength: deferred due to nature of surgery \par Stability: no joint instability on provocative testing · \par \par \par right Shoulder · \par \par Inspection/Palpation: no tenderness, swelling or deformities · \par Range of Motion: full and painless in all planes, no crepitus · \par Stability: no joint instability on provocative testing · [de-identified] : No new imaging done today.

## 2023-01-09 NOTE — DISCUSSION/SUMMARY
[de-identified] : LYNN is a 52 year female here today for followup 6.5 months SPO Left (L) shoulder arthroscopic RTC repair, DEB, JORDI, sad, acromioplasty. DOS: 6/27/22. Patient denies any recent fevers, chills or night sweats. Patient denies any radiating pain, numbness, tingling sensations, burning sensations, urinary or bowel incontinence. \par \par We had a thorough discussion regarding the nature of her pain, the pathophysiology, as well as all treatment options. Based on clinical exam findings and given her lack of ROM she could benefit from cortisone injection.  She would like to proceed.  Following the injection she had about 30 degree of range of motion improvement in forward flexion and external rotation.  I referred her to physical therapy. All questions were answered and the patient verbalized understanding. The patient is in agreement with this treatment plan.

## 2023-01-09 NOTE — HISTORY OF PRESENT ILLNESS
[Stable] : stable [de-identified] : LYNN is a 52 year female here today for followup 6.5 months SPO Left (L) shoulder arthroscopic RTC repair, DEB, JORDI, sera, acromioplasty. DOS: 6/27/22. Patient denies any recent fevers, chills or night sweats. Patient denies any radiating pain, numbness, tingling sensations, burning sensations, urinary or bowel incontinence.  Patient reports continued pain and she has not been attending any therapy.  She is frustrated with her current condition.  She reports that she is currently undergoing cardiac work-up as she has a history of MI in the past with a recurrence of chest pain.  She was unable to tolerate positioning for a CAT scan which prompted her return for follow-up.\par

## 2023-01-19 ENCOUNTER — RX RENEWAL (OUTPATIENT)
Age: 53
End: 2023-01-19

## 2023-01-23 ENCOUNTER — RX RENEWAL (OUTPATIENT)
Age: 53
End: 2023-01-23

## 2023-01-25 ENCOUNTER — APPOINTMENT (OUTPATIENT)
Dept: CT IMAGING | Facility: CLINIC | Age: 53
End: 2023-01-25

## 2023-01-27 ENCOUNTER — NON-APPOINTMENT (OUTPATIENT)
Age: 53
End: 2023-01-27

## 2023-01-31 ENCOUNTER — NON-APPOINTMENT (OUTPATIENT)
Age: 53
End: 2023-01-31

## 2023-01-31 ENCOUNTER — APPOINTMENT (OUTPATIENT)
Dept: CARDIOLOGY | Facility: CLINIC | Age: 53
End: 2023-01-31
Payer: MEDICAID

## 2023-01-31 VITALS
WEIGHT: 145 LBS | SYSTOLIC BLOOD PRESSURE: 120 MMHG | OXYGEN SATURATION: 100 % | DIASTOLIC BLOOD PRESSURE: 80 MMHG | HEIGHT: 64 IN | HEART RATE: 84 BPM | BODY MASS INDEX: 24.75 KG/M2

## 2023-01-31 DIAGNOSIS — R94.31 ABNORMAL ELECTROCARDIOGRAM [ECG] [EKG]: ICD-10-CM

## 2023-01-31 DIAGNOSIS — I20.8 OTHER FORMS OF ANGINA PECTORIS: ICD-10-CM

## 2023-01-31 PROCEDURE — 93000 ELECTROCARDIOGRAM COMPLETE: CPT

## 2023-01-31 PROCEDURE — 99213 OFFICE O/P EST LOW 20 MIN: CPT | Mod: 25

## 2023-01-31 NOTE — DISCUSSION/SUMMARY
[FreeTextEntry1] : Patient with prior history of stress induced cardiomyopathy and abnormal resting EKG complains of exertional substernal discomfort radiating to the neck and relieved by rest.  Stress echo ordered to assess for ischemia. [EKG obtained to assist in diagnosis and management of assessed problem(s)] : EKG obtained to assist in diagnosis and management of assessed problem(s)

## 2023-01-31 NOTE — HISTORY OF PRESENT ILLNESS
[FreeTextEntry1] : 52 year old woman with prior history of abnormal EKG, NSTEMI and stress induced cardiomyopathy (Takotsubo).  She has been complaining of substernal discomfort radiating to the neck with exertion and relieved by rest.

## 2023-02-24 ENCOUNTER — RX RENEWAL (OUTPATIENT)
Age: 53
End: 2023-02-24

## 2023-03-26 ENCOUNTER — RX RENEWAL (OUTPATIENT)
Age: 53
End: 2023-03-26

## 2023-03-27 ENCOUNTER — RX RENEWAL (OUTPATIENT)
Age: 53
End: 2023-03-27

## 2023-03-27 RX ORDER — GABAPENTIN 300 MG/1
300 CAPSULE ORAL
Qty: 30 | Refills: 0 | Status: ACTIVE | COMMUNITY
Start: 2022-08-08 | End: 1900-01-01

## 2023-04-13 ENCOUNTER — APPOINTMENT (OUTPATIENT)
Dept: ORTHOPEDIC SURGERY | Facility: CLINIC | Age: 53
End: 2023-04-13
Payer: MEDICAID

## 2023-04-13 VITALS — HEIGHT: 64 IN | WEIGHT: 145 LBS | BODY MASS INDEX: 24.75 KG/M2

## 2023-04-13 DIAGNOSIS — M67.819 OTHER SPECIFIED DISORDERS OF SYNOVIUM AND TENDON, UNSPECIFIED SHOULDER: ICD-10-CM

## 2023-04-13 DIAGNOSIS — G25.89 OTHER SPECIFIED EXTRAPYRAMIDAL AND MOVEMENT DISORDERS: ICD-10-CM

## 2023-04-13 PROCEDURE — 99214 OFFICE O/P EST MOD 30 MIN: CPT

## 2023-04-13 NOTE — HISTORY OF PRESENT ILLNESS
[de-identified] : LYNN is a 52 year female here today for followup 6.5 months SPO Left (L) shoulder arthroscopic RTC repair, DEB, JORDI, sera, acromioplasty. DOS: 6/27/22. Patient notes that she has been going to PT at [facility] in [place]. She has been having trigger point injections which are helping. Patient had a massage a few weeks ago with some short term relief.

## 2023-04-13 NOTE — PHYSICAL EXAM
[de-identified] : Physical Exam: \par General: Well appearing, no acute distress \par Neurologic: A&Ox3, No focal deficits \par Head: NCAT without abrasions, lacerations, or ecchymosis to head, face, or scalp \par Eyes: No scleral icterus, no gross abnormalities \par Respiratory: Equal chest wall expansion bilaterally, no accessory muscle use \par Lymphatic: No lymphadenopathy palpated \par Skin: Warm and dry Psychiatric: Normal mood and affect  \par \par Left Shoulder · \par \par Inspection/Palpation: no tenderness, swelling or deformities · \par Range of Motion: no crepitus with ROM; Active FF 0 - 130 ; Passive FF 0 - 150 ; ER at side 0 - 35; IR to L5  · \par Strength: deferred due to nature of surgery \par Stability: no joint instability on provocative testing · \par \par \par right Shoulder · \par \par Inspection/Palpation: no tenderness, swelling or deformities · \par Range of Motion: full and painless in all planes, no crepitus · \par Stability: no joint instability on provocative testing ·

## 2023-04-13 NOTE — DISCUSSION/SUMMARY
[de-identified] : We had a thorough discussion regarding the nature of her pain, the pathophysiology, as well as all treatment options. Patient should continue with physical therapy and trigger point injections. Patient was given prescription of formal physical therapy that she will perform 2x/wk for 6-8 wks. Patient will follow up in 6-8 wks for repeat clinical assessment. All questions were answered and the patient verbalized understanding. The patient is in agreement with this treatment plan.

## 2023-04-13 NOTE — ADDENDUM
[FreeTextEntry1] : Documented by Becky Shaffer acting as a scribe for Dr. Mckeon and Mike Lewis PA-C on 04/13/2023.   All medical record entries made by the Scribe were at my, Dr. Mckeon, and Mike Lewis's, direction and personally dictated by me on 04/13/2023. I have reviewed the chart and agree that the record accurately reflects my personal performance of the history, physical exam, procedure and imaging.

## 2023-04-18 ENCOUNTER — APPOINTMENT (OUTPATIENT)
Dept: CARDIOLOGY | Facility: CLINIC | Age: 53
End: 2023-04-18
Payer: MEDICAID

## 2023-04-18 PROCEDURE — 93320 DOPPLER ECHO COMPLETE: CPT

## 2023-04-18 PROCEDURE — 93351 STRESS TTE COMPLETE: CPT

## 2023-04-18 PROCEDURE — 93325 DOPPLER ECHO COLOR FLOW MAPG: CPT

## 2023-04-19 ENCOUNTER — NON-APPOINTMENT (OUTPATIENT)
Age: 53
End: 2023-04-19

## 2023-04-26 RX ORDER — METOPROLOL SUCCINATE 50 MG/1
50 TABLET, EXTENDED RELEASE ORAL DAILY
Qty: 90 | Refills: 0 | Status: ACTIVE | COMMUNITY
Start: 2019-07-10 | End: 1900-01-01

## 2023-05-11 ENCOUNTER — RX RENEWAL (OUTPATIENT)
Age: 53
End: 2023-05-11

## 2023-05-15 ENCOUNTER — RX RENEWAL (OUTPATIENT)
Age: 53
End: 2023-05-15

## 2023-06-20 ENCOUNTER — RX RENEWAL (OUTPATIENT)
Age: 53
End: 2023-06-20

## 2023-06-28 ENCOUNTER — TRANSCRIPTION ENCOUNTER (OUTPATIENT)
Age: 53
End: 2023-06-28

## 2023-09-04 ENCOUNTER — NON-APPOINTMENT (OUTPATIENT)
Age: 53
End: 2023-09-04

## 2023-09-06 ENCOUNTER — APPOINTMENT (OUTPATIENT)
Dept: ORTHOPEDIC SURGERY | Facility: CLINIC | Age: 53
End: 2023-09-06
Payer: MEDICAID

## 2023-09-06 DIAGNOSIS — M75.82 OTHER SHOULDER LESIONS, LEFT SHOULDER: ICD-10-CM

## 2023-09-06 DIAGNOSIS — M25.512 PAIN IN LEFT SHOULDER: ICD-10-CM

## 2023-09-06 PROCEDURE — 99214 OFFICE O/P EST MOD 30 MIN: CPT | Mod: 25

## 2023-09-06 PROCEDURE — 20611 DRAIN/INJ JOINT/BURSA W/US: CPT | Mod: LT

## 2023-09-07 NOTE — PHYSICAL EXAM
[de-identified] : Physical Exam:  General: Well appearing, no acute distress  Neurologic: A&Ox3, No focal deficits  Head: NCAT without abrasions, lacerations, or ecchymosis to head, face, or scalp  Eyes: No scleral icterus, no gross abnormalities  Respiratory: Equal chest wall expansion bilaterally, no accessory muscle use  Lymphatic: No lymphadenopathy palpated  Skin: Warm and dry  Psychiatric: Normal mood and affect  Examination of the Left shoulder shows no obvious deformity, swelling or erythema. Mild tenderness to palpation over the anterior shoulder. No AC joint tenderness. The patient demonstrates active/passive ROM of Forward Flexion to 125 degrees, External Rotation to 40 degrees and Internal Rotation to a mid lumbar level. The patient has a positive Barrow and Neers test. No pain with cross body adduction, lift off testing, AC compression testing or Yergason testing. The patient has 4/5 strength to forward flexion with pronation, internal and external rotation. Compartments are soft and nontender. The patient has 2+ cap refill and sensation is intact in the hand.   Right shoulder shows no deformity. No tenderness to palpation over the biceps or AC joint. The patient has Forward Flexion to 170 degrees, External Rotation to 45 degrees and Internal Rotation to a mid lumbar level. 5/5 strength to forward flexion with pronation, internal and external rotation. Compartments are soft and nontender. 2+ cap refill. Sensation intact distally.

## 2023-09-07 NOTE — HISTORY OF PRESENT ILLNESS
[de-identified] : LYNN is a 52 year female here today for followup SPO Left (L) shoulder arthroscopic RTC repair, DEB, JORDI, sad, acromioplasty. DOS: 6/27/22. She is requesting a cortisone injection. She last had a left shoulder subacromial injection on 1/9/2023.  Patient reports continued pain in the shoulder and as well as dysfunction.  She has second opinion at [facility] with an orthopedist, she was not happy with the second opinion.  She has some neck pain at baseline as well.  She has not had her neck worked up.

## 2023-09-07 NOTE — DISCUSSION/SUMMARY
[de-identified] : I had a lengthy discussion with the patient regarding their current condition. We discussed the treatment options including operative and nonoperative management. At this time I recommended repeat MRI of her left shoulder.  I did recommend she see Dr. Cadena for work-up and evaluation of her cervical spine as a contributing force.  She also requested potential trigger point injections.  She can apply moist heat.  She will follow-up with us after MRI.  All questions were answered.

## 2023-09-07 NOTE — PROCEDURE
[de-identified] : Injection: US guided Left shoulder (Subacromial).      A discussion was had with the patient regarding this procedure and all questions were answered. All risks, benefits and alternatives were discussed. These included but were not limited to bleeding, infection, and allergic reaction. Alcohol was used to clean the skin, and ChloraPrep was used to sterilize and prep the area in the posterior aspect of the left shoulder. Ethyl chloride spray was then used as a topical anesthetic. A 22-gauge needle was used to inject 3cc 1% xylocaine, 2cc 0.25% bupivacaine and 1cc of 40mg/mL triamcinolone acetonide into the left subacromial space. Ultrasound guidance was used for localization. A sterile band aid was then applied. The patient tolerated the procedure well and there were no complications. Post injection instructions were given.

## 2023-10-30 ENCOUNTER — APPOINTMENT (OUTPATIENT)
Dept: PHYSICAL MEDICINE AND REHAB | Facility: CLINIC | Age: 53
End: 2023-10-30
Payer: MEDICAID

## 2023-10-30 VITALS
WEIGHT: 146 LBS | DIASTOLIC BLOOD PRESSURE: 67 MMHG | HEIGHT: 64 IN | SYSTOLIC BLOOD PRESSURE: 102 MMHG | RESPIRATION RATE: 14 BRPM | HEART RATE: 76 BPM | BODY MASS INDEX: 24.92 KG/M2

## 2023-10-30 PROCEDURE — 99214 OFFICE O/P EST MOD 30 MIN: CPT

## 2023-10-30 RX ORDER — MELOXICAM 15 MG/1
15 TABLET ORAL
Qty: 30 | Refills: 2 | Status: DISCONTINUED | COMMUNITY
Start: 2022-08-08 | End: 2023-10-30

## 2023-11-01 ENCOUNTER — NON-APPOINTMENT (OUTPATIENT)
Age: 53
End: 2023-11-01

## 2023-11-01 ENCOUNTER — APPOINTMENT (OUTPATIENT)
Dept: CARDIOLOGY | Facility: CLINIC | Age: 53
End: 2023-11-01
Payer: MEDICAID

## 2023-11-01 VITALS
HEART RATE: 79 BPM | OXYGEN SATURATION: 98 % | DIASTOLIC BLOOD PRESSURE: 74 MMHG | WEIGHT: 146 LBS | BODY MASS INDEX: 24.92 KG/M2 | SYSTOLIC BLOOD PRESSURE: 114 MMHG | RESPIRATION RATE: 14 BRPM | HEIGHT: 64 IN

## 2023-11-01 PROCEDURE — 99214 OFFICE O/P EST MOD 30 MIN: CPT | Mod: 25

## 2023-11-01 PROCEDURE — 93000 ELECTROCARDIOGRAM COMPLETE: CPT

## 2023-11-01 RX ORDER — NITROGLYCERIN 0.4 MG/1
0.4 TABLET SUBLINGUAL
Qty: 25 | Refills: 3 | Status: ACTIVE | COMMUNITY
Start: 2023-11-01 | End: 1900-01-01

## 2023-11-08 ENCOUNTER — RX RENEWAL (OUTPATIENT)
Age: 53
End: 2023-11-08

## 2023-11-08 RX ORDER — METOPROLOL SUCCINATE 50 MG/1
50 TABLET, EXTENDED RELEASE ORAL
Qty: 90 | Refills: 1 | Status: ACTIVE | COMMUNITY
Start: 2020-12-17 | End: 1900-01-01

## 2023-12-21 ENCOUNTER — APPOINTMENT (OUTPATIENT)
Dept: PHYSICAL MEDICINE AND REHAB | Facility: CLINIC | Age: 53
End: 2023-12-21
Payer: MEDICAID

## 2023-12-21 VITALS
SYSTOLIC BLOOD PRESSURE: 127 MMHG | WEIGHT: 145 LBS | HEART RATE: 64 BPM | RESPIRATION RATE: 14 BRPM | DIASTOLIC BLOOD PRESSURE: 72 MMHG | BODY MASS INDEX: 24.75 KG/M2 | HEIGHT: 64 IN

## 2023-12-21 DIAGNOSIS — M47.816 SPONDYLOSIS W/OUT MYELOPATHY OR RADICULOPATHY, LUMBAR REGION: ICD-10-CM

## 2023-12-21 DIAGNOSIS — M47.812 SPONDYLOSIS W/OUT MYELOPATHY OR RADICULOPATHY, CERVICAL REGION: ICD-10-CM

## 2023-12-21 DIAGNOSIS — M79.7 FIBROMYALGIA: ICD-10-CM

## 2023-12-21 PROCEDURE — 99213 OFFICE O/P EST LOW 20 MIN: CPT

## 2023-12-21 NOTE — DATA REVIEWED
[FreeTextEntry1] : ue Dec 19 11:46:41 [facility] [place]  -- Final Report  Patient Name : MYCHAL^LYNN^^^^ Patient ID : [id number] Patient  : 1970 Accession Number : [id number]   PATIENT NAME: LYNN HORTA  ID NUMBER: [id number]  YOB: 1970  REFERRING PHYSICIAN: ZEINA DE LOS SANTOS DO [address] [address]  DATE OF SERVICE: 2023  MAGNETIC RESONANCE IMAGING SCAN OF THE LUMBAR SPINE  TECHNIQUE: Multiplanar, multisequential MRI was performed in the neutral/sitting position.  HISTORY: The patient complains of lower back pain radiating to bilateral lower extremity with weakness and difficulty walking.  INTERPRETATION: Desiccation of all the lumbar intervertebral discs is noted. There is no evidence of posterior disc bulging or herniation at the L1-2 through the L3-4 levels. A diffuse disc bulge is identified at the L4-5 level which impinges upon the thecal sac and extends into the inferior aspects of the L4-5 neural foramina bilaterally. Minimal narrowing of the L4-5 and L5-S1 discs is noted. Small ventral marginal osteophytes and diffuse disc bulging are present at the L5-S1 level. The L5-S1 disc bulge impinges upon the thecal sac, upon the S1 nerve roots and extends into the inferior aspects of the L5-S1 neural foramina bilaterally. A left laminotomy is noted at L5-S1.  The above findings are unchanged in comparison to prior study performed on 2020. Minimal bilateral facet hypertrophy is present at the L4-5 and L5-S1 levels. The facet hypertrophy at L4-5 is not present in the prior study. The degree of facet hypertrophy at L5-S1 is unchanged.  The spinal canal is within normal limits in size with no evidence of spinal stenosis. No lesions are noted involving the cauda equina or conus medullaris.  A normal lumbar lordotic curve is present. There is no evidence of loss of height involving the lumbar vertebral bodies. The bone marrow demonstrates normal signal intensity.  No intradural lesions are identified.  No paravertebral soft tissue abnormalities are identified.  IMPRESSION:  * L4-5 disc bulge which impinges upon the thecal sac and partially compromises the L4-5 neural foramina bilaterally.  * L5-S1 disc bulge which impinges upon the thecal sac, upon the S1 nerve roots and partially compromises the L5-S1 neural foramina bilaterally.  * Minimal degenerative disease.  Abelino Muhammad M.D.  Diplomate of the American Board of Radiology   with Added Qualifications in Neuroradiology  /[facility] [place] Dec 19 11:46:41 [facility] [place]  -- Final Report  Patient Name : MARQUIS^^^^ Patient ID : [id number] Patient  : 1970 Accession Number : [id number]   PATIENT NAME: LYNN HORTA  ID NUMBER: [id number]  YOB: 1970  REFERRING PHYSICIAN: ZEINA DE LOS SANTOS DO [address] [address]  DATE OF SERVICE: 2023  MAGNETIC RESONANCE IMAGING SCAN OF THE CERVICAL SPINE  TECHNIQUE: Multiplanar, multisequential MRI was performed in the neutral/sitting position.  HISTORY: The patient complains of neck pain radiating to bilateral upper extremities with left upper extremity numbness, and headache.  INTERPRETATION: Desiccation of all the cervical intervertebral discs is noted. A posterior disc bulge is identified at the C2-3 level causing a small ventral impression upon the thecal sac. There is no evidence of posterior disc bulging or herniation at the C3-4 and C4-5 levels.  Ventral marginal osteophytes and diffuse disc bulging are present at the C5-6 and C6-7 levels. The C5-6 and C6-7 disc bulges cause moderate ventral impressions upon the thecal sac. Minimal narrowing of the C5-6 and C6-7 discs is noted. There is no evidence of posterior disc bulging or herniation at C7-T1.  The cervical spinal cord is not enlarged. No signal abnormalities are noted within the cord. There is no evidence of an intradural lesion.  The spinal canal is within normal limits in size without evidence of stenosis. There is no evidence of articular facet hypertrophy. The neural foramina are not narrowed.  The bone marrow demonstrates normal signal intensity.  The cervical lordotic curvature is well maintained. The vertebral body heights are preserved.  The paravertebral soft tissues are unremarkable.  IMPRESSION:  * C2-3 disc bulge causing a small ventral impression upon the thecal sac,  * C5-6 and C6-7 disc bulges causing moderate ventral impressions upon the thecal sac.  * Minimal degenerative disease.  Abelino Muhammad M.D.  Diplomate of the American Board of Radiology   with Added Qualifications in Neuroradiology  /

## 2023-12-21 NOTE — PHYSICAL EXAM
[FreeTextEntry1] : PE: Constitutional: In NAD, calm and cooperative MSK (Neck and Back)  Inspection: no gross swelling identified  Palpation: Tenderness of the bilateral lower cervical and lumbar paraspinals  ROM: Pain at end cervical extension/flexion and with lumbar extension/flexion but AROM intact  Strength: 5/5 strength in bilateral upper and lower extremities  Reflexes: 2+ Biceps/Brachioradialis/Triceps/patella/Achilles reflex bilaterally, Hoffmans/Clonus negative bilaterally  Sensation: Intact to light touch in bilateral upper and lower extremities  Special tests: Spurlings test negative bilaterally, SLR negative bilaterally, ROXY/FADIR negative bilaterally.
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
## 2023-12-21 NOTE — ASSESSMENT
[FreeTextEntry1] : Ms. LYNN HORTA is a 53 year old female who presents with chronic neck and low back pain, along with chronic L shoulder pain being treated by Ortho. Pain is likely due to underlying spondylosis, but likely worsened by her underlying fibromyalgia. Denies any red flag signs. Will recommend: - MRI C/L Spine reviewed with patient.  - She will follow up with her rheumatologist about a trial of cymbalta as she is currently undergoing a medication change for her RA. - Again stressed role of aerobic exercise for which she understands. - At this time, would not recommend targeted injection therapy given widespread nature of pain.  RTC as needed. Patient aware of red flag signs including any changes to their bowel/bladder control, groin numbness or new weakness. Patient knows to seek immediate attention by calling 911 or going to nearest ER if these symptoms appear.

## 2024-03-24 ENCOUNTER — RX RENEWAL (OUTPATIENT)
Age: 54
End: 2024-03-24

## 2024-03-24 RX ORDER — IBUPROFEN 800 MG/1
800 TABLET, FILM COATED ORAL 3 TIMES DAILY
Qty: 60 | Refills: 2 | Status: ACTIVE | COMMUNITY
Start: 2022-07-11 | End: 1900-01-01

## 2024-03-25 ENCOUNTER — TRANSCRIPTION ENCOUNTER (OUTPATIENT)
Age: 54
End: 2024-03-25

## 2024-04-08 ENCOUNTER — APPOINTMENT (OUTPATIENT)
Dept: CARDIOLOGY | Facility: CLINIC | Age: 54
End: 2024-04-08

## 2024-06-18 ENCOUNTER — APPOINTMENT (OUTPATIENT)
Dept: CARDIOLOGY | Facility: CLINIC | Age: 54
End: 2024-06-18
Payer: MEDICAID

## 2024-06-18 ENCOUNTER — NON-APPOINTMENT (OUTPATIENT)
Age: 54
End: 2024-06-18

## 2024-06-18 VITALS
BODY MASS INDEX: 24.92 KG/M2 | HEART RATE: 91 BPM | OXYGEN SATURATION: 98 % | DIASTOLIC BLOOD PRESSURE: 76 MMHG | HEIGHT: 64 IN | SYSTOLIC BLOOD PRESSURE: 130 MMHG | WEIGHT: 146 LBS

## 2024-06-18 PROCEDURE — 93000 ELECTROCARDIOGRAM COMPLETE: CPT

## 2024-06-18 PROCEDURE — G2211 COMPLEX E/M VISIT ADD ON: CPT | Mod: NC

## 2024-06-18 PROCEDURE — 99214 OFFICE O/P EST MOD 30 MIN: CPT | Mod: 25

## 2024-06-18 RX ORDER — ROSUVASTATIN CALCIUM 5 MG/1
5 TABLET, FILM COATED ORAL DAILY
Qty: 90 | Refills: 3 | Status: ACTIVE | COMMUNITY
Start: 2024-06-18 | End: 1900-01-01

## 2024-06-18 NOTE — HISTORY OF PRESENT ILLNESS
[FreeTextEntry1] : 53 year old woman with a history of MI with nl coronaries.  Recently has had high -135.  Unable to do a calcium score due to shoulder problem.  She is free of exertional chest pain or SOB.

## 2024-06-18 NOTE — DISCUSSION/SUMMARY
[FreeTextEntry1] : History of MINOCA.  HLD  rosuvastatin 5 mg started.  Follow up lipid profile ordered. [EKG obtained to assist in diagnosis and management of assessed problem(s)] : EKG obtained to assist in diagnosis and management of assessed problem(s)

## 2024-06-22 ENCOUNTER — NON-APPOINTMENT (OUTPATIENT)
Age: 54
End: 2024-06-22

## 2024-07-30 ENCOUNTER — APPOINTMENT (OUTPATIENT)
Dept: CARDIOLOGY | Facility: CLINIC | Age: 54
End: 2024-07-30

## 2024-10-18 ENCOUNTER — RX RENEWAL (OUTPATIENT)
Age: 54
End: 2024-10-18

## 2025-01-27 NOTE — PATIENT PROFILE ADULT. - NS PRO CONTRA FLU 1
Detail Level: Detailed Quality 226: Preventive Care And Screening: Tobacco Use: Screening And Cessation Intervention: Patient screened for tobacco use and is an ex/non-smoker out of season (available sept 1 thru apr 2 only)

## 2025-01-28 ENCOUNTER — RX RENEWAL (OUTPATIENT)
Age: 55
End: 2025-01-28

## 2025-05-28 ENCOUNTER — NON-APPOINTMENT (OUTPATIENT)
Age: 55
End: 2025-05-28

## 2025-09-03 ENCOUNTER — NON-APPOINTMENT (OUTPATIENT)
Age: 55
End: 2025-09-03

## 2025-09-09 ENCOUNTER — NON-APPOINTMENT (OUTPATIENT)
Age: 55
End: 2025-09-09